# Patient Record
Sex: FEMALE | Race: BLACK OR AFRICAN AMERICAN | NOT HISPANIC OR LATINO | Employment: UNEMPLOYED | URBAN - METROPOLITAN AREA
[De-identification: names, ages, dates, MRNs, and addresses within clinical notes are randomized per-mention and may not be internally consistent; named-entity substitution may affect disease eponyms.]

---

## 2017-03-30 ENCOUNTER — TELEPHONE (OUTPATIENT)
Dept: OBSTETRICS AND GYNECOLOGY | Facility: CLINIC | Age: 42
End: 2017-03-30

## 2017-03-30 NOTE — TELEPHONE ENCOUNTER
Spoke with pt. Pt is still interested in getting IUD. Pt informed that we need to try to get her to come in before 07/17. Pt will call us as soon as she starts her cycle.

## 2017-04-03 ENCOUNTER — TELEPHONE (OUTPATIENT)
Dept: OBSTETRICS AND GYNECOLOGY | Facility: CLINIC | Age: 42
End: 2017-04-03

## 2017-04-03 NOTE — TELEPHONE ENCOUNTER
----- Message from Santos Mackay sent at 4/3/2017  9:43 AM CDT -----  Contact: Self  Pt called to schedule procedure to have birth control implanted. Pt can be reached @ [phone].

## 2017-05-22 ENCOUNTER — TELEPHONE (OUTPATIENT)
Dept: OBSTETRICS AND GYNECOLOGY | Facility: CLINIC | Age: 42
End: 2017-05-22

## 2017-05-22 NOTE — TELEPHONE ENCOUNTER
----- Message from Santos Mackay sent at 5/22/2017  3:27 PM CDT -----  Contact: Self  Pt called to schedule procedure. Pt can be reached @ [phone]

## 2017-05-24 ENCOUNTER — PROCEDURE VISIT (OUTPATIENT)
Dept: OBSTETRICS AND GYNECOLOGY | Facility: CLINIC | Age: 42
End: 2017-05-24
Payer: COMMERCIAL

## 2017-05-24 VITALS
WEIGHT: 183 LBS | SYSTOLIC BLOOD PRESSURE: 138 MMHG | HEIGHT: 66 IN | BODY MASS INDEX: 29.41 KG/M2 | DIASTOLIC BLOOD PRESSURE: 84 MMHG

## 2017-05-24 DIAGNOSIS — Z30.430 ENCOUNTER FOR INSERTION OF MIRENA IUD: Primary | ICD-10-CM

## 2017-05-24 DIAGNOSIS — Z32.02 NEGATIVE PREGNANCY TEST: ICD-10-CM

## 2017-05-24 LAB
B-HCG UR QL: NEGATIVE
CTP QC/QA: YES

## 2017-05-24 PROCEDURE — 81025 URINE PREGNANCY TEST: CPT | Mod: QW,S$GLB,, | Performed by: OBSTETRICS & GYNECOLOGY

## 2017-05-24 PROCEDURE — 58300 INSERT INTRAUTERINE DEVICE: CPT | Mod: S$GLB,,, | Performed by: OBSTETRICS & GYNECOLOGY

## 2017-05-24 NOTE — PROCEDURES
"[facility]  Ambulatory Clinic   Obstetrics & Gynecology    Date:  2017    Procedure:  Mirena IUD insertion (CPT 18931)    LMP:  2017    UPT:  Negative    Indication:  IUD contraception    History:  Nuria Franklin is a 41 y.o. , here for Mirena IUD insertion.  Pt has no major complaints today.      Consents:  We discussed the risks, benefits, indications, and alternatives to IUD use. She understands that with IUD insertion there is a risk of bleeding, infection, uterine perforation, and expulsion of device. All of her questions were answered to her satisfaction. Pt voiced understanding, Informed consents obtained.     Vitals:  /84 (BP Location: Left arm, Patient Position: Sitting, BP Method: Manual)   Ht 5' 6" (1.676 m)   Wt 83 kg (183 lb)   LMP 2017 (Exact Date)   BMI 29.54 kg/m²     Physical Exam:  Abdomen soft, non-tender, no masses. External genitalia, vaginal wall and cervix without gross abnormality.  Bimanual exam reveals a ~14 wks size uterus, non-tender, mid-plain uterus, without adnexal mass or tenderness.     Procedure Details:      A time out was performed to confirmed the correct patient and procedure.    The cervix was visualized with a speculum.     Cervix was cleaned with betadine.      A single tooth tenaculum was placed on the anterior lip of the cervix.     The uterus sounds to ~10 cm using sterile technique.     The IUD was loaded and placed high in the uterine fundus without difficulty using sterile technique.     The string was then cut with a ~3 cm tail.    The tenaculum and speculum were removed.      The patient tolerated the procedure well.  Sterile technique was maintained.  Hemostasis noted.  VSSAF, pain scale 0/10 at end of procedure.    Assessment:      Encounter for insertion of intrauterine contraceptive device (Mirena, Lot: [id number] EXP: )    Plan:      Post IUD placement counseling and precautions " discussed.    Manage post IUD placement pain with NSAIDS, Tylenol prn.    Pt advised on how to check for IUD strings.    Pt was clearly reminded that the Mirena IUD will need to be removed within 5 years from date of insertion.    Return 4 weeks for IUD check, or sooner for any concerns.  All questions answered, pt voiced understanding.  Go to ER for any emergencies.        Aamir Lopez MD

## 2017-06-21 ENCOUNTER — LAB VISIT (OUTPATIENT)
Dept: LAB | Facility: HOSPITAL | Age: 42
End: 2017-06-21
Attending: OBSTETRICS & GYNECOLOGY
Payer: COMMERCIAL

## 2017-06-21 ENCOUNTER — OFFICE VISIT (OUTPATIENT)
Dept: OBSTETRICS AND GYNECOLOGY | Facility: CLINIC | Age: 42
End: 2017-06-21
Payer: COMMERCIAL

## 2017-06-21 VITALS
DIASTOLIC BLOOD PRESSURE: 90 MMHG | WEIGHT: 183.63 LBS | HEIGHT: 66 IN | SYSTOLIC BLOOD PRESSURE: 140 MMHG | BODY MASS INDEX: 29.51 KG/M2

## 2017-06-21 DIAGNOSIS — N93.9 ABNORMAL UTERINE BLEEDING (AUB): ICD-10-CM

## 2017-06-21 DIAGNOSIS — Z30.431 IUD CHECK UP: Primary | ICD-10-CM

## 2017-06-21 DIAGNOSIS — D50.0 IRON DEFICIENCY ANEMIA DUE TO CHRONIC BLOOD LOSS: ICD-10-CM

## 2017-06-21 LAB
ANISOCYTOSIS BLD QL SMEAR: SLIGHT
BASOPHILS # BLD AUTO: 0.05 K/UL
BASOPHILS NFR BLD: 1.1 %
DACRYOCYTES BLD QL SMEAR: ABNORMAL
DIFFERENTIAL METHOD: ABNORMAL
EOSINOPHIL # BLD AUTO: 0.1 K/UL
EOSINOPHIL NFR BLD: 1.1 %
ERYTHROCYTE [DISTWIDTH] IN BLOOD BY AUTOMATED COUNT: 17.4 %
HCT VFR BLD AUTO: 25.4 %
HGB BLD-MCNC: 7.1 G/DL
HYPOCHROMIA BLD QL SMEAR: ABNORMAL
LYMPHOCYTES # BLD AUTO: 1.3 K/UL
LYMPHOCYTES NFR BLD: 29.4 %
MCH RBC QN AUTO: 18.1 PG
MCHC RBC AUTO-ENTMCNC: 28 %
MCV RBC AUTO: 65 FL
MONOCYTES # BLD AUTO: 0.4 K/UL
MONOCYTES NFR BLD: 8.4 %
NEUTROPHILS # BLD AUTO: 2.7 K/UL
NEUTROPHILS NFR BLD: 60.2 %
OVALOCYTES BLD QL SMEAR: ABNORMAL
PLATELET # BLD AUTO: 241 K/UL
PLATELET BLD QL SMEAR: ABNORMAL
PMV BLD AUTO: 10.2 FL
POIKILOCYTOSIS BLD QL SMEAR: SLIGHT
POLYCHROMASIA BLD QL SMEAR: ABNORMAL
RBC # BLD AUTO: 3.93 M/UL
TARGETS BLD QL SMEAR: ABNORMAL
TSH SERPL DL<=0.005 MIU/L-ACNC: 0.72 UIU/ML
WBC # BLD AUTO: 4.42 K/UL

## 2017-06-21 PROCEDURE — 99213 OFFICE O/P EST LOW 20 MIN: CPT | Mod: S$GLB,,, | Performed by: OBSTETRICS & GYNECOLOGY

## 2017-06-21 PROCEDURE — 85025 COMPLETE CBC W/AUTO DIFF WBC: CPT

## 2017-06-21 PROCEDURE — 84443 ASSAY THYROID STIM HORMONE: CPT

## 2017-06-21 PROCEDURE — 36415 COLL VENOUS BLD VENIPUNCTURE: CPT

## 2017-06-21 PROCEDURE — 99999 PR PBB SHADOW E&M-EST. PATIENT-LVL III: CPT | Mod: PBBFAC,,, | Performed by: OBSTETRICS & GYNECOLOGY

## 2017-06-21 RX ORDER — DOCUSATE SODIUM 100 MG/1
100 CAPSULE, LIQUID FILLED ORAL 2 TIMES DAILY PRN
Qty: 60 CAPSULE | Refills: 2 | Status: SHIPPED | OUTPATIENT
Start: 2017-06-21 | End: 2017-09-01 | Stop reason: SDUPTHER

## 2017-06-21 RX ORDER — FERROUS SULFATE 325(65) MG
325 TABLET ORAL 2 TIMES DAILY
Qty: 90 TABLET | Refills: 2 | Status: SHIPPED | OUTPATIENT
Start: 2017-06-21 | End: 2018-02-23 | Stop reason: ALTCHOICE

## 2017-06-21 NOTE — PROGRESS NOTES
"[facility]  Ambulatory Clinic  Obstetrics & Gynecology    Visit Date:  2017    Chief Complaint:  Mirena IUD check      Subjective:      Nuria Franklin is a 41 y.o. , LMP 2017, here for Mirena IUD check.  Pt is pleased with her IUD.    Pt reports lighter periods since IUD.    PT denies sxs of anemia.    Pt vaginal discharge, dysmenorrhea, dyspareunia, pelvic pain, breast mass/skin changes, GI or urinary complaints.     Otherwise, pt is in her usual state of health and has good follow-up with her PCP.    Review of Systems:     Constitutional:  No fever, fatigue  Respiratory:  No shortness of breath  Cardiovascular:  No chest pain, leg swelling  Gastrointestinal:  No abdominal pain  Genitourinary:  No dysuria, frequency  Reproductive:  See HPI     Objective:    BP (!) 140/90 (BP Location: Left arm, Patient Position: Sitting, BP Method: Manual)   Ht 5' 6" (1.676 m)   Wt 83.3 kg (183 lb 10.3 oz)   LMP 2017 (Exact Date)   BMI 29.64 kg/m²   Repeat /78, P 70, R18     GENERAL:  NAD. Well-nourished.  HEENT:  NCAT, moist mucus membranes. Neck supple.  LUNGS:  CTA-B.  HEART:  RRR, no m/g/r.  ABDOMEN:  Soft, non-tender, non-distended. Normoactive BS. No obvious organomegaly.   EXT:  Symmetric w/o cramping, claudication, or edema. +2 distal pulses.   SKIN:  No rashes or bruising.  NEURO:  Grossly intact bilaterally.  PSYCH:  Mood & affect appropriate.      PELVIC:  Female external genitalia w/o any obvious lesions. Female hair distribution. Adequate perineal body. Normal urethral meatus. No gross lymphadenopathy.   Vagina:  Pink, moist, well-rugated. Adequate support. No obvious lesion. No discharge.   Cervix:  No cervical motion tenderness, discharge, or obvious lesions. IUD string visualized ~3 cm.  Uterus:  Small, non-tender, normal contour.  Adnexa:  No masses, non-tender.   Rectal:  Declined. No obvious external lesions.   Wet prep:  Negative.    Chaperone " present for exam.    Assessment:     41 y.o. :    1. Mirena IUD check  2. Anemia  3. Abnormal uterine bleeding     Plan:    Risks, benefits, and alternatives to Mirena reviewed.  Pt tolerating it well and would like to continue.    Pt advised on how to check for IUD strings.    Pt was clearly reminded that the Mirena IUD will need to be removed within 5 years from date of insertion.    Refill Fe/colace.  Order CBC, TSH.    Pt is considering hysterectomy if no improvement with Mirena IUD.    Encourage healthy lifestyle modifications.      F/u with PCP for health maintenance.    Return 1 year for well woman exam, or sooner as needed.  All questions answered, pt voiced understanding.       Aamir Lopez MD

## 2017-09-01 ENCOUNTER — HOSPITAL ENCOUNTER (EMERGENCY)
Facility: HOSPITAL | Age: 42
Discharge: HOME | End: 2017-09-01
Attending: EMERGENCY MEDICINE
Payer: COMMERCIAL

## 2017-09-01 VITALS
DIASTOLIC BLOOD PRESSURE: 82 MMHG | WEIGHT: 182 LBS | TEMPERATURE: 98 F | SYSTOLIC BLOOD PRESSURE: 139 MMHG | BODY MASS INDEX: 29.38 KG/M2 | RESPIRATION RATE: 18 BRPM | HEART RATE: 70 BPM | OXYGEN SATURATION: 100 %

## 2017-09-01 DIAGNOSIS — S50.11XA TRAUMATIC HEMATOMA OF RIGHT FOREARM, INITIAL ENCOUNTER: Primary | ICD-10-CM
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
## 2017-09-01 LAB
B-HCG UR QL: NEGATIVE
CTP QC/QA: YES

## 2017-09-01 PROCEDURE — 99284 EMERGENCY DEPT VISIT MOD MDM: CPT

## 2017-09-01 PROCEDURE — 25000003 PHARM REV CODE 250: Performed by: EMERGENCY MEDICINE

## 2017-09-01 PROCEDURE — 81025 URINE PREGNANCY TEST: CPT | Performed by: EMERGENCY MEDICINE

## 2017-09-01 RX ORDER — HYDROCODONE BITARTRATE AND ACETAMINOPHEN 10; 325 MG/1; MG/1
1 TABLET ORAL EVERY 4 HOURS PRN
Qty: 10 TABLET | Refills: 0 | Status: SHIPPED | OUTPATIENT
Start: 2017-09-01 | End: 2018-01-31 | Stop reason: ALTCHOICE

## 2017-09-01 RX ORDER — DOCUSATE SODIUM 100 MG/1
100 CAPSULE, LIQUID FILLED ORAL 2 TIMES DAILY PRN
Qty: 20 CAPSULE | Refills: 0 | Status: SHIPPED | OUTPATIENT
Start: 2017-09-01 | End: 2018-02-23

## 2017-09-01 RX ORDER — HYDROCODONE BITARTRATE AND ACETAMINOPHEN 5; 325 MG/1; MG/1
2 TABLET ORAL
Status: COMPLETED | OUTPATIENT
Start: 2017-09-01 | End: 2017-09-01

## 2017-09-01 RX ORDER — KETOROLAC TROMETHAMINE 10 MG/1
10 TABLET, FILM COATED ORAL EVERY 6 HOURS PRN
Qty: 20 TABLET | Refills: 0 | Status: SHIPPED | OUTPATIENT
Start: 2017-09-01 | End: 2018-01-31 | Stop reason: ALTCHOICE

## 2017-09-01 RX ADMIN — HYDROCODONE BITARTRATE AND ACETAMINOPHEN 2 TABLET: 5; 325 TABLET ORAL at 05:09

## 2017-09-01 NOTE — ED PROVIDER NOTES
Encounter Date: 9/1/2017    SCRIBE #1 NOTE: I, Nadeem Vivienne, am scribing for, and in the presence of, Lashawn Wilson MD. Other sections scribed: HPI, ROS, PE.       History     Chief Complaint   Patient presents with    Fall     Pt slipped in shower and injured right forearm. CO pain and noted swelling      CC: Fall  HPI: This 41 y.o. female with iron deficiency anemia presents to the ED c/o R elbow and forearm pain s/p slip and fall in her bathroom at 2230 last night. Pt states that she hit her arm on the tub. She reports moderate (6/10) constant acute pain. She denies any head trauma, loss of consciousness, numbness, neck pain, back pain, other injuries.            Review of patient's allergies indicates:  No Known Allergies  Past Medical History:   Diagnosis Date    Anemia     Miscarriage     2013    Preeclampsia 10/6/2014     Past Surgical History:   Procedure Laterality Date    DILATION AND CURETTAGE OF UTERUS       History reviewed. No pertinent family history.  Social History   Substance Use Topics    Smoking status: Never Smoker    Smokeless tobacco: Never Used    Alcohol use No     Review of Systems   Constitutional: Negative for chills and fever.   HENT: Negative for ear pain, rhinorrhea and sore throat.    Eyes: Negative for pain and visual disturbance.   Respiratory: Negative for cough and shortness of breath.    Cardiovascular: Negative for chest pain.   Gastrointestinal: Negative for abdominal pain, diarrhea and nausea.   Genitourinary: Negative for difficulty urinating and dyspareunia.   Musculoskeletal: Positive for arthralgias (R elbow) and myalgias (R forearm). Negative for neck pain and neck stiffness.   Skin: Negative for rash and wound.   Neurological: Negative for syncope, weakness, numbness and headaches.   Hematological:        Anemia       Physical Exam     Initial Vitals [09/01/17 0033]   BP Pulse Resp Temp SpO2   (!) 181/95 70 20 98.7 °F (37.1 °C) 100 %      MAP       123.67          Physical Exam    Nursing note and vitals reviewed.  Constitutional: She appears well-developed and well-nourished. She is not diaphoretic. No distress.   HENT:   Head: Normocephalic and atraumatic.   Mouth/Throat: Oropharynx is clear and moist.   Eyes: Conjunctivae and EOM are normal. Pupils are equal, round, and reactive to light.   Neck: Normal range of motion. Neck supple.   Cardiovascular: Normal rate, regular rhythm and normal heart sounds.   Pulmonary/Chest: Breath sounds normal. No respiratory distress. She has no wheezes. She has no rhonchi. She has no rales. She exhibits no tenderness.   Abdominal: She exhibits no distension. There is no tenderness.   Musculoskeletal: Normal range of motion. She exhibits tenderness.   Large hematoma to R ulnar forearm with superficial bruising noted. Hand is warm with good cap refill. No crepitus or bony tenderness in arm; she is ranging all joints well.    Neurological: She is alert and oriented to person, place, and time. She has normal strength.   Skin: Skin is warm and dry.   Psychiatric: Her behavior is normal. Thought content normal.         ED Course   Procedures  Labs Reviewed   POCT URINE PREGNANCY          X-Rays:   Independently Interpreted Readings:   Other Readings:  RUE XR no fx    Medical Decision Making:   History:   Old Medical Records: I decided to obtain old medical records.  Old Records Summarized: records from clinic visits.  Initial Assessment:   41 y.o. Female with R arm pain s/p fall.  Differential Diagnosis:   Ddx includes fx, dislocation, sprain/strain, other.  Independently Interpreted Test(s):   I have ordered and independently interpreted X-rays - see prior notes.  Clinical Tests:   Lab Tests: Ordered and Reviewed  The following lab test(s) were unremarkable: UPT  Radiological Study: Ordered and Reviewed  ED Management:  XR negative for fx. Ranging arm well. Has large hematoma. Supportive tx.             Scribe Attestation:   Scribe #1: I  performed the above scribed service and the documentation accurately describes the services I performed. I attest to the accuracy of the note.    Attending Attestation:           Physician Attestation for Scribe:  Physician Attestation Statement for Scribe #1: I, Lashawn Wilson MD, reviewed documentation, as scribed by Nadeem Palafox in my presence, and it is both accurate and complete.                 ED Course      Clinical Impression:   The primary encounter diagnosis was Traumatic hematoma of right forearm, initial encounter. A diagnosis of Fall was also pertinent to this visit.                           Lashawn Wilson MD  09/02/17 1534

## 2017-09-01 NOTE — ED TRIAGE NOTES
Pt presents to ED with c/o a fall that occurred at 2230 on 8/31.  Pt reports that she tripped in the bathroom and hit her right forearm on the tub.  Pt denies hitting her head.  Pt reports pain in the right forearm 10/10.  Pt denies taking any medication for pain.

## 2017-09-06 ENCOUNTER — OFFICE VISIT (OUTPATIENT)
Dept: FAMILY MEDICINE | Facility: CLINIC | Age: 42
End: 2017-09-06
Payer: COMMERCIAL

## 2017-09-06 VITALS
RESPIRATION RATE: 17 BRPM | OXYGEN SATURATION: 98 % | HEART RATE: 69 BPM | HEIGHT: 67 IN | WEIGHT: 187.38 LBS | DIASTOLIC BLOOD PRESSURE: 98 MMHG | TEMPERATURE: 98 F | BODY MASS INDEX: 29.41 KG/M2 | SYSTOLIC BLOOD PRESSURE: 146 MMHG
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
## 2017-09-06 DIAGNOSIS — S40.021A TRAUMATIC HEMATOMA OF RIGHT UPPER ARM, INITIAL ENCOUNTER: ICD-10-CM

## 2017-09-06 DIAGNOSIS — I10 ESSENTIAL HYPERTENSION: Primary | ICD-10-CM

## 2017-09-06 PROCEDURE — 3077F SYST BP >= 140 MM HG: CPT | Mod: S$GLB,,, | Performed by: INTERNAL MEDICINE

## 2017-09-06 PROCEDURE — 99999 PR PBB SHADOW E&M-EST. PATIENT-LVL III: CPT | Mod: PBBFAC,,, | Performed by: INTERNAL MEDICINE

## 2017-09-06 PROCEDURE — 3080F DIAST BP >= 90 MM HG: CPT | Mod: S$GLB,,, | Performed by: INTERNAL MEDICINE

## 2017-09-06 PROCEDURE — 3008F BODY MASS INDEX DOCD: CPT | Mod: S$GLB,,, | Performed by: INTERNAL MEDICINE

## 2017-09-06 PROCEDURE — 99204 OFFICE O/P NEW MOD 45 MIN: CPT | Mod: S$GLB,,, | Performed by: INTERNAL MEDICINE

## 2017-09-06 RX ORDER — AMLODIPINE BESYLATE 5 MG/1
5 TABLET ORAL DAILY
Qty: 30 TABLET | Refills: 0 | Status: SHIPPED | OUTPATIENT
Start: 2017-09-06 | End: 2017-09-27 | Stop reason: SDUPTHER

## 2017-09-06 NOTE — PROGRESS NOTES
Subjective:       Patient ID: Nuria Franklin is a 41 y.o. female.    Chief Complaint:     She presents to establish care and for follow-up after recent ER visit status post fall with injury to her right forearm.  She fell in the bathtub almost one week ago.  Her x-rays are negative for fracture.  She continues to have 6 out of 10 pain with significant swelling and bruising.  She has not returned to work.  She does report a history of high blood pressure over the past 3 years.  She is taking medications off and on but never consistently.  She does not recall which medication she's been on.  She does complain of intermittent headaches.      Review of Systems   Musculoskeletal: Positive for arthralgias and joint swelling.   Skin: Positive for wound.   Neurological: Positive for headaches.       Objective:      Physical Exam   Constitutional: She is oriented to person, place, and time. She appears well-developed and well-nourished. No distress.   HENT:   Head: Normocephalic and atraumatic.   Right Ear: External ear normal.   Left Ear: External ear normal.   Eyes: Conjunctivae are normal. No scleral icterus.   Cardiovascular: Normal rate, regular rhythm and normal heart sounds.  Exam reveals no gallop and no friction rub.    No murmur heard.  Pulmonary/Chest: Effort normal and breath sounds normal. No respiratory distress. She has no wheezes. She has no rales.   Musculoskeletal:        Right forearm: She exhibits tenderness and swelling.   Right forearm extending to the elbow and lower upper arm with bruising and swelling with significant in the forearm with associated tenderness to palpation   Neurological: She is alert and oriented to person, place, and time. No cranial nerve deficit.   Skin: Skin is warm and dry.   Psychiatric: She has a normal mood and affect.   Vitals reviewed.      Assessment:       1. Essential hypertension    2. Traumatic hematoma of right upper arm, initial encounter    3. Fall, initial  encounter        Plan:       Nuria was seen today for hypertension.    Diagnoses and all orders for this visit:    Essential hypertension - she reports a two-year history of elevated blood pressure.  She has not taken medication consistently.  Starting amlodipine today.  We have discussed importance of compliance and the risk of long-term complications associated with uncontrolled blood pressure.  We have also discussed low sodium diet, avoiding fast food and prepackaged foods.  -     amlodipine (NORVASC) 5 MG tablet; Take 1 tablet (5 mg total) by mouth once daily.    Traumatic hematoma of right upper arm, initial encounter - status post fall 1 week ago.  X-rays negative.  Continue current medications.  She will return to work on September 11 Fall, initial encounter         follow-up in 3 weeks

## 2017-09-27 ENCOUNTER — OFFICE VISIT (OUTPATIENT)
Dept: FAMILY MEDICINE | Facility: CLINIC | Age: 42
End: 2017-09-27
Payer: COMMERCIAL

## 2017-09-27 ENCOUNTER — LAB VISIT (OUTPATIENT)
Dept: LAB | Facility: HOSPITAL | Age: 42
End: 2017-09-27
Attending: INTERNAL MEDICINE
Payer: COMMERCIAL

## 2017-09-27 VITALS
BODY MASS INDEX: 28.89 KG/M2 | RESPIRATION RATE: 17 BRPM | OXYGEN SATURATION: 99 % | WEIGHT: 184.06 LBS | SYSTOLIC BLOOD PRESSURE: 128 MMHG | HEART RATE: 75 BPM | TEMPERATURE: 99 F | HEIGHT: 67 IN | DIASTOLIC BLOOD PRESSURE: 74 MMHG

## 2017-09-27 DIAGNOSIS — I10 ESSENTIAL HYPERTENSION: ICD-10-CM

## 2017-09-27 LAB
ANION GAP SERPL CALC-SCNC: 6 MMOL/L
BUN SERPL-MCNC: 15 MG/DL
CALCIUM SERPL-MCNC: 9.7 MG/DL
CHLORIDE SERPL-SCNC: 111 MMOL/L
CO2 SERPL-SCNC: 24 MMOL/L
CREAT SERPL-MCNC: 0.8 MG/DL
EST. GFR  (AFRICAN AMERICAN): >60 ML/MIN/1.73 M^2
EST. GFR  (NON AFRICAN AMERICAN): >60 ML/MIN/1.73 M^2
GLUCOSE SERPL-MCNC: 97 MG/DL
POTASSIUM SERPL-SCNC: 4.7 MMOL/L
SODIUM SERPL-SCNC: 141 MMOL/L

## 2017-09-27 PROCEDURE — 36415 COLL VENOUS BLD VENIPUNCTURE: CPT | Mod: PN

## 2017-09-27 PROCEDURE — 3008F BODY MASS INDEX DOCD: CPT | Mod: S$GLB,,, | Performed by: INTERNAL MEDICINE

## 2017-09-27 PROCEDURE — 99213 OFFICE O/P EST LOW 20 MIN: CPT | Mod: S$GLB,,, | Performed by: INTERNAL MEDICINE

## 2017-09-27 PROCEDURE — 99999 PR PBB SHADOW E&M-EST. PATIENT-LVL III: CPT | Mod: PBBFAC,,, | Performed by: INTERNAL MEDICINE

## 2017-09-27 PROCEDURE — 3078F DIAST BP <80 MM HG: CPT | Mod: S$GLB,,, | Performed by: INTERNAL MEDICINE

## 2017-09-27 PROCEDURE — 80048 BASIC METABOLIC PNL TOTAL CA: CPT

## 2017-09-27 PROCEDURE — 3074F SYST BP LT 130 MM HG: CPT | Mod: S$GLB,,, | Performed by: INTERNAL MEDICINE

## 2017-09-27 RX ORDER — AMLODIPINE BESYLATE 5 MG/1
5 TABLET ORAL DAILY
Qty: 30 TABLET | Refills: 5 | Status: SHIPPED | OUTPATIENT
Start: 2017-09-27 | End: 2018-02-23 | Stop reason: ALTCHOICE

## 2017-09-27 NOTE — PROGRESS NOTES
Subjective:       Patient ID: Nuria Franklin is a 42 y.o. female.    Chief Complaint: Hypertension and Follow-up (3 week )    She presents for follow-up of her blood pressure today.  She is tolerating amlodipine without side effects.  Her headaches have resolved.  She now understands importance of compliance with blood pressure medication.      Review of Systems   Cardiovascular: Negative for leg swelling.   Neurological: Negative for headaches.       Objective:      Physical Exam   Constitutional: She is oriented to person, place, and time. She appears well-developed and well-nourished. No distress.   HENT:   Head: Normocephalic and atraumatic.   Eyes: Conjunctivae are normal. No scleral icterus.   Neurological: She is alert and oriented to person, place, and time.   Psychiatric: She has a normal mood and affect.   Vitals reviewed.      Assessment:       1. Essential hypertension        Plan:       Nuria was seen today for hypertension and follow-up.    Diagnoses and all orders for this visit:    Essential hypertension - BP significantly improved on amlodipine 5 mg.  Continue this dose.  BMP today.  Stressed the importance of compliance.  Follow-up in 4 months and when necessary  -     amlodipine (NORVASC) 5 MG tablet; Take 1 tablet (5 mg total) by mouth once daily.  -     Basic metabolic panel; Future

## 2017-11-09 ENCOUNTER — OFFICE VISIT (OUTPATIENT)
Dept: FAMILY MEDICINE | Facility: CLINIC | Age: 42
End: 2017-11-09
Payer: COMMERCIAL

## 2017-11-09 VITALS
OXYGEN SATURATION: 99 % | HEIGHT: 67 IN | SYSTOLIC BLOOD PRESSURE: 126 MMHG | TEMPERATURE: 99 F | HEART RATE: 87 BPM | DIASTOLIC BLOOD PRESSURE: 74 MMHG | WEIGHT: 186.5 LBS | RESPIRATION RATE: 17 BRPM | BODY MASS INDEX: 29.27 KG/M2

## 2017-11-09 DIAGNOSIS — M25.422 EFFUSION OF LEFT OLECRANON BURSA: Primary | ICD-10-CM

## 2017-11-09 PROCEDURE — 99213 OFFICE O/P EST LOW 20 MIN: CPT | Mod: S$GLB,,, | Performed by: INTERNAL MEDICINE

## 2017-11-09 PROCEDURE — 99999 PR PBB SHADOW E&M-EST. PATIENT-LVL III: CPT | Mod: PBBFAC,,, | Performed by: INTERNAL MEDICINE

## 2017-11-09 NOTE — PROGRESS NOTES
Subjective:       Patient ID: Nuria Franklin is a 42 y.o. female.    Chief Complaint: Cyst (left elbow )    She presents with a one month history of cyst on her left elbow.  She is not sure of any trauma but did have a fall over one month ago.  She denies any associated pain, redness or drainage.  She is a  and often hits her elbow when cleaning.       Review of Systems   Musculoskeletal: Negative for arthralgias and joint swelling.       Objective:      Physical Exam   Constitutional: She is oriented to person, place, and time. She appears well-developed and well-nourished. No distress.   HENT:   Head: Normocephalic and atraumatic.   Eyes: Conjunctivae are normal. No scleral icterus.   Musculoskeletal:   Large left elbow effusion.  No tenderness, no redness or warmth.  No decreased ROM   Neurological: She is alert and oriented to person, place, and time.   Skin: Skin is warm and dry.   Psychiatric: She has a normal mood and affect.   Vitals reviewed.      Assessment:       1. Effusion of left olecranon bursa        Plan:       Nuria was seen today for cyst.    Diagnoses and all orders for this visit:    Effusion of left olecranon bursa - first noted one month ago.  Reassured the patient.  Will refer to orthopedics for further management.   -     Ambulatory referral to Orthopedics       f/u prn

## 2017-11-10 ENCOUNTER — TELEPHONE (OUTPATIENT)
Dept: FAMILY MEDICINE | Facility: CLINIC | Age: 42
End: 2017-11-10

## 2018-01-31 ENCOUNTER — LAB VISIT (OUTPATIENT)
Dept: LAB | Facility: HOSPITAL | Age: 43
End: 2018-01-31
Attending: OBSTETRICS & GYNECOLOGY
Payer: COMMERCIAL

## 2018-01-31 ENCOUNTER — OFFICE VISIT (OUTPATIENT)
Dept: OBSTETRICS AND GYNECOLOGY | Facility: CLINIC | Age: 43
End: 2018-01-31
Payer: COMMERCIAL

## 2018-01-31 VITALS
DIASTOLIC BLOOD PRESSURE: 82 MMHG | BODY MASS INDEX: 29.76 KG/M2 | HEIGHT: 67 IN | SYSTOLIC BLOOD PRESSURE: 120 MMHG | WEIGHT: 189.63 LBS

## 2018-01-31 DIAGNOSIS — D25.1 INTRAMURAL, SUBMUCOUS, AND SUBSEROUS LEIOMYOMA OF UTERUS: ICD-10-CM

## 2018-01-31 DIAGNOSIS — D50.0 IRON DEFICIENCY ANEMIA DUE TO CHRONIC BLOOD LOSS: ICD-10-CM

## 2018-01-31 DIAGNOSIS — N93.9 ABNORMAL UTERINE BLEEDING (AUB): Primary | ICD-10-CM

## 2018-01-31 DIAGNOSIS — D25.2 INTRAMURAL, SUBMUCOUS, AND SUBSEROUS LEIOMYOMA OF UTERUS: ICD-10-CM

## 2018-01-31 DIAGNOSIS — D25.0 INTRAMURAL, SUBMUCOUS, AND SUBSEROUS LEIOMYOMA OF UTERUS: ICD-10-CM

## 2018-01-31 LAB
ALBUMIN SERPL BCP-MCNC: 3.6 G/DL
ALP SERPL-CCNC: 97 U/L
ALT SERPL W/O P-5'-P-CCNC: 23 U/L
ANION GAP SERPL CALC-SCNC: 6 MMOL/L
AST SERPL-CCNC: 20 U/L
BASOPHILS # BLD AUTO: 0.04 K/UL
BASOPHILS NFR BLD: 0.5 %
BILIRUB SERPL-MCNC: 0.4 MG/DL
BUN SERPL-MCNC: 13 MG/DL
CALCIUM SERPL-MCNC: 9.3 MG/DL
CHLORIDE SERPL-SCNC: 109 MMOL/L
CO2 SERPL-SCNC: 24 MMOL/L
CREAT SERPL-MCNC: 0.8 MG/DL
DIFFERENTIAL METHOD: ABNORMAL
EOSINOPHIL # BLD AUTO: 0.1 K/UL
EOSINOPHIL NFR BLD: 1.1 %
ERYTHROCYTE [DISTWIDTH] IN BLOOD BY AUTOMATED COUNT: 15.1 %
EST. GFR  (AFRICAN AMERICAN): >60 ML/MIN/1.73 M^2
EST. GFR  (NON AFRICAN AMERICAN): >60 ML/MIN/1.73 M^2
GLUCOSE SERPL-MCNC: 93 MG/DL
HCT VFR BLD AUTO: 30.7 %
HGB BLD-MCNC: 9.8 G/DL
LYMPHOCYTES # BLD AUTO: 1.9 K/UL
LYMPHOCYTES NFR BLD: 25.7 %
MCH RBC QN AUTO: 25.2 PG
MCHC RBC AUTO-ENTMCNC: 31.9 G/DL
MCV RBC AUTO: 79 FL
MONOCYTES # BLD AUTO: 0.6 K/UL
MONOCYTES NFR BLD: 7.7 %
NEUTROPHILS # BLD AUTO: 4.8 K/UL
NEUTROPHILS NFR BLD: 64.9 %
PLATELET # BLD AUTO: 205 K/UL
PMV BLD AUTO: 11.1 FL
POTASSIUM SERPL-SCNC: 3.9 MMOL/L
PROT SERPL-MCNC: 7.3 G/DL
RBC # BLD AUTO: 3.89 M/UL
SODIUM SERPL-SCNC: 139 MMOL/L
WBC # BLD AUTO: 7.44 K/UL

## 2018-01-31 PROCEDURE — 3008F BODY MASS INDEX DOCD: CPT | Mod: S$GLB,,, | Performed by: OBSTETRICS & GYNECOLOGY

## 2018-01-31 PROCEDURE — 85025 COMPLETE CBC W/AUTO DIFF WBC: CPT

## 2018-01-31 PROCEDURE — 99213 OFFICE O/P EST LOW 20 MIN: CPT | Mod: S$GLB,,, | Performed by: OBSTETRICS & GYNECOLOGY

## 2018-01-31 PROCEDURE — 36415 COLL VENOUS BLD VENIPUNCTURE: CPT

## 2018-01-31 PROCEDURE — 80053 COMPREHEN METABOLIC PANEL: CPT

## 2018-01-31 PROCEDURE — 99999 PR PBB SHADOW E&M-EST. PATIENT-LVL IV: CPT | Mod: PBBFAC,,, | Performed by: OBSTETRICS & GYNECOLOGY

## 2018-01-31 NOTE — PROGRESS NOTES
"[facility]  Ambulatory Clinic  Obstetrics & Gynecology    Date of Visit:  2018    Chief Complaint:  F/u vaginal bleeding     Subjective:      Nuria Franklin is a 42 y.o.  here f/u for vaginal bleeding.  Pt is here today specifically requesting hysterectomy due to vaginal bleeding.  Pt has attempted medical therapy with Mirena IUD.  "I am sure, don't want to deal with bleeding anymore, I feel tired all time".  Pt was in May 2016 for blood transfusion secondary to her sxs anemia.  Pt denies any sxs of anemia, sob, dizziness, weakness, heart palpitation, or CP.  Pt denies any vaginal discharge, pelvic pain, breast complaints, GI or urinary compliants.    Pt declined Sensorion  services, daughter present for visit.    Review of Systems:      GENERAL:  No fever, +fatigue. No excessive weight gain or loss  HEENT:  No head injury, headaches, hearing changes, visual disturbance  RESPIRATORY:  No cough, shortness of breath  CARDIOVASCULAR:  No chest pain, heart palpitations, leg swelling  BREAST:  No lump, pain, nipple discharge, skin changes  GASTROINTESTINAL:  No nausea, vomiting, constipation, diarrhea, abd pain, rectal bleeding   URINARY:  No dysuria, frequency, hematuria  GENITOURINARY:  See HPI  ENDOCRINE:  No heat or cold intolerance  HEMATOLOGIC:  No easy bruisability or bleeding   NEUROLOGIC:  No dizziness, weakness, syncope    Objective:     /82 (BP Location: Left arm, Patient Position: Sitting, BP Method: Large (Manual))   Ht 5' 6.5" (1.689 m)   Wt 86 kg (189 lb 9.5 oz)   LMP 2017 (Approximate)   BMI 30.14 kg/m²    Pulse 74, Resp rate 16     GENERAL:  NAD  HEENT:  NCAT,moist mucus membranes, neck supple.  LUNGS:  Clear  HEART:  Regular rate and rhythm with physiologic heart sounds.  ABDOMEN:  Soft, non-tender without any guarding, rigidity or rebound.    EXT:  Symmetric. No cramping, claudication, or edema. +2 distal pulses. FROM.  SKIN:  No " "rashes, good turgor & capillary refill.  NEURO:  Grossly intact bilaterally. +2 DTR.  PELVIC:  Pt declined.    Chaperone present for exam.    Lab Results   Component Value Date    WBC 7.44 2018    HGB 9.8 (L) 2018    HCT 30.7 (L) 2018    MCV 79 (L) 2018     2018     Assessment:     42 y.o. :    1. Abnormal uterine bleeding  2. H/o blood transfusion 2016 for sxs anemia  3. Uterine fibroids    Plan:    D/w pt in great detail her abnormal uterine bleeding.   Pt is requesting hysterectomy "don't want to do anything else".  The proposed procedure will be total laparoscopic hysterectomy.  Risks, benefits, and alternatives to hysterectomy reviewed.  Bleeding/pelvic/anemia precautions.  Fe supplementation.    Return for pre-op visit pending OR scheduling, or sooner if any concerns.      Pt voiced understanding.       Aamir Lopez MD  "

## 2018-02-23 ENCOUNTER — HOSPITAL ENCOUNTER (OUTPATIENT)
Dept: PREADMISSION TESTING | Facility: HOSPITAL | Age: 43
Discharge: HOME | End: 2018-02-23
Attending: OBSTETRICS & GYNECOLOGY
Payer: COMMERCIAL

## 2018-02-23 ENCOUNTER — OFFICE VISIT (OUTPATIENT)
Dept: OBSTETRICS AND GYNECOLOGY | Facility: CLINIC | Age: 43
End: 2018-02-23
Payer: COMMERCIAL

## 2018-02-23 VITALS
TEMPERATURE: 98 F | HEIGHT: 66 IN | SYSTOLIC BLOOD PRESSURE: 127 MMHG | BODY MASS INDEX: 29.89 KG/M2 | WEIGHT: 186 LBS | RESPIRATION RATE: 17 BRPM | OXYGEN SATURATION: 99 % | HEART RATE: 80 BPM | DIASTOLIC BLOOD PRESSURE: 84 MMHG

## 2018-02-23 VITALS
WEIGHT: 185.19 LBS | DIASTOLIC BLOOD PRESSURE: 82 MMHG | HEIGHT: 67 IN | BODY MASS INDEX: 29.07 KG/M2 | SYSTOLIC BLOOD PRESSURE: 142 MMHG

## 2018-02-23 DIAGNOSIS — N93.9 ABNORMAL UTERINE BLEEDING (AUB): Primary | ICD-10-CM

## 2018-02-23 DIAGNOSIS — D50.0 IRON DEFICIENCY ANEMIA DUE TO CHRONIC BLOOD LOSS: ICD-10-CM

## 2018-02-23 DIAGNOSIS — Z01.818 PRE-OP TESTING: Primary | ICD-10-CM

## 2018-02-23 DIAGNOSIS — N93.9 ABNORMAL UTERINE BLEEDING (AUB): ICD-10-CM

## 2018-02-23 DIAGNOSIS — Z92.89 H/O TRANSFUSION OF PACKED RED BLOOD CELLS: ICD-10-CM

## 2018-02-23 LAB
ALBUMIN SERPL BCP-MCNC: 3.7 G/DL
ALP SERPL-CCNC: 100 U/L
ALT SERPL W/O P-5'-P-CCNC: 27 U/L
ANION GAP SERPL CALC-SCNC: 6 MMOL/L
AST SERPL-CCNC: 23 U/L
BASOPHILS # BLD AUTO: 0.02 K/UL
BASOPHILS NFR BLD: 0.4 %
BILIRUB SERPL-MCNC: 0.5 MG/DL
BUN SERPL-MCNC: 17 MG/DL
CALCIUM SERPL-MCNC: 9.4 MG/DL
CHLORIDE SERPL-SCNC: 109 MMOL/L
CO2 SERPL-SCNC: 22 MMOL/L
CREAT SERPL-MCNC: 0.8 MG/DL
DIFFERENTIAL METHOD: ABNORMAL
EOSINOPHIL # BLD AUTO: 0.1 K/UL
EOSINOPHIL NFR BLD: 1.5 %
ERYTHROCYTE [DISTWIDTH] IN BLOOD BY AUTOMATED COUNT: 14.1 %
EST. GFR  (AFRICAN AMERICAN): >60 ML/MIN/1.73 M^2
EST. GFR  (NON AFRICAN AMERICAN): >60 ML/MIN/1.73 M^2
GLUCOSE SERPL-MCNC: 111 MG/DL
HCT VFR BLD AUTO: 29.9 %
HGB BLD-MCNC: 9.4 G/DL
LYMPHOCYTES # BLD AUTO: 1.3 K/UL
LYMPHOCYTES NFR BLD: 25.3 %
MCH RBC QN AUTO: 24.5 PG
MCHC RBC AUTO-ENTMCNC: 31.4 G/DL
MCV RBC AUTO: 78 FL
MONOCYTES # BLD AUTO: 0.5 K/UL
MONOCYTES NFR BLD: 8.5 %
NEUTROPHILS # BLD AUTO: 3.4 K/UL
NEUTROPHILS NFR BLD: 64.3 %
PLATELET # BLD AUTO: 215 K/UL
PMV BLD AUTO: 11.3 FL
POTASSIUM SERPL-SCNC: 4.2 MMOL/L
PROT SERPL-MCNC: 7.7 G/DL
RBC # BLD AUTO: 3.83 M/UL
SODIUM SERPL-SCNC: 137 MMOL/L
WBC # BLD AUTO: 5.3 K/UL

## 2018-02-23 PROCEDURE — 99499 UNLISTED E&M SERVICE: CPT | Mod: S$GLB,,, | Performed by: OBSTETRICS & GYNECOLOGY

## 2018-02-23 PROCEDURE — 80053 COMPREHEN METABOLIC PANEL: CPT

## 2018-02-23 PROCEDURE — 99999 PR PBB SHADOW E&M-EST. PATIENT-LVL III: CPT | Mod: PBBFAC,,, | Performed by: OBSTETRICS & GYNECOLOGY

## 2018-02-23 PROCEDURE — 85025 COMPLETE CBC W/AUTO DIFF WBC: CPT

## 2018-02-23 RX ORDER — NAPROXEN SODIUM 220 MG
220 TABLET ORAL 2 TIMES DAILY PRN
Status: ON HOLD | COMMUNITY
End: 2018-03-02 | Stop reason: HOSPADM

## 2018-02-23 NOTE — DISCHARGE INSTRUCTIONS
Your surgery is scheduled for__FRIDAY MARCH 2, 2018_______________.    Call [phone] between 2 pm and 5 pm _THURSDAY MARCH 1, 2018_____ to find out your arrival time for the day of surgery.      Report to SAME DAY SURGERY UNIT at _______AM   on the 2nd floor of the hospital.  Use the front entrance of the hospital before 6 am.                                             Important instructions:   Do not eat or drink after 12 midnight, including water.  It is okay to brush your teeth.                                                                                                                                                                                                 Do not have gum, candy or mints.    DO NOT TAKE ANY MEDICATIONS THE AM OF SURGERY                  Return to the hospital lab on _THURSDAY MARCH 1, 2018  @ 9:30 PM_____for additional blood test.       Prep instructions:     SHOWER   OTHER_____________      Please shower the night before and the morning of your surgery.   For this procedure you will shower at home the night before and also the morning of surgery with HIBICLENS soap provided by the pre op nurse. Do not use this soap on your face or genitals. You will shower a third time here at the hospital on the morning of surgery. Rinse completely after each shower.  If your surgery is on your abdomen,  Be sure to wash your Belly Button ( Naval )  Please place clean linens on your bed the night before surgery. Please wear fresh clean clothing after each shower.   No shaving of procedural area at least 4-5 days before surgery due to  increased risk of skin irritation and/or possible infection.      Do not wear make- up, including mascara.     You may wear deodorant only.     Do not wear powder, body lotion or cologne.     If you are going home on the same day of surgery, you must have arrangements for a ride home.  You will not be able to drive home if you were given anesthesia or  sedation.       Stop taking Aspirin, Ibuprofen, Motrin and Aleve at least 3-5 days before your surgery.                                                                                                                                                                                                              May take Tylenol / Acetaminophen  If needed     Wear loose fitting clothes allowing for bandages.     Please leave money and valuables home.        You may bring your cell phone.     Call the doctor if fever or illness should occur before your surgery.    Call [phone] to contact us here at [facility] if needed.

## 2018-02-25 NOTE — H&P
[facility]  History & Physical  Obstetrics & Gynecology    Visit Date:  2018    Chief Complaint:  Pre-op for total laparoscopic hysterectomy     History of Present Illness:      Nuria Franklin is a 42 y.o.  here for pre-op for a total laparoscopic hysterectomy.    Pt is requesting definitive treatment with hysterectomy due to abnormal uterine bleeding/menorrhagia with h/o anemia requiring blood transfusion.    Pt attempted medical management with Mirena IUD without satisfactory resolution of her symptoms.    Pt is resolute in her decision to proceed with surgery and has declined further medical management or other conservative therapies.      Otherwise, pt is in her usual state of health.    Past Medical History:      Diagnosis Date    Anemia     Miscarriage         Preeclampsia 10/6/2014     Past Surgical History:      Procedure Laterality Date    DILATION AND CURETTAGE OF UTERUS       Medications:     Current Outpatient Prescriptions on File Prior to Visit   Medication Sig Dispense Refill    naproxen sodium (ANAPROX) 220 MG tablet Take 220 mg by mouth 2 (two) times daily as needed.       Allergies:     NKDA      Gynecologic History:      Denies active STI or h/o abnormal Pap     Social History:      Denies tobacco, alcohol abuse or illicit drug use  Denies domestic abuse     Family History:       History reviewed. No pertinent family history.      Noncontributory    Review of Systems:      GENERAL:  No fever, fatigue, excessive weight gain or loss  HEENT:  No head injury, headaches, hearing changes, visual disturbance  RESPIRATORY:  No cough, shortness of breath  CARDIOVASCULAR:  No chest pain, heart palpitations, leg swelling  GASTROINTESTINAL:  No nausea, vomiting, constipation, diarrhea, abd pain, rectal bleeding   GENITOURINARY:  See HPI.  HEMATOLOGIC:  No easy bruisability or bleeding   LYMPHATICS:  No enlarged nodes  MUSCULOSKELETAL:  No joint pain or  "swelling  SKIN:  No rash, lesions, jaundice  NEUROLOGIC:  No dizziness, weakness, syncope  PSYCHIATRIC:  No depression, anxiety or mood swings     Physical Exam:     BP (!) 142/82 (BP Location: Left arm, Patient Position: Sitting, BP Method: Large (Manual))   Ht 5' 6.5" (1.689 m)   Wt 84 kg (185 lb 3 oz)   BMI 29.44 kg/m²      GENERAL: NAD, well-nourished  HEENT: NCAT, moist mucus membranes, anicteric, neck supple  LUNGS: CTA-B  HEART: RRR  ABDOMEN: Soft, non-tender. Normoactive BS. No obvious organomegaly.  EXT: Symmetric w/o cramping, claudication, or edema. +2 distal pulses.  SKIN: No rashes or bruising  NEURO: Grossly intact bilaterally  PSYCH: Mood and affect appropriate  GENITOURINARY:  NFEG no lesion. No vaginal or cervical lesion. No bleeding or discharge. No CMT. Uterus and ovaries small, NT. Wet prep negative. IUD string visualized ~3 cm from Os. Declined rectal exam. No obvious external lesions.    Labs/studies:    Lab Results   Component Value Date    WBC 5.30 2018    HGB 9.4 (L) 2018    HCT 29.9 (L) 2018    MCV 78 (L) 2018     2018     Pelvic U/S:  2016    The uterus measures 15.3 x 7.2 x 8.0cm and demonstrates a large amount of complex material within its endometrial cavity.  The endometrium appears heterogeneous and the endometrial thickness is 12 mm.  There is a 1.3 cm mass in the posterior body of the uterus suggestive of a fibroid.  The uterus is otherwise unremarkable.    The right ovary measures 3.5 x 1.9 x 2.1 cm and is unremarkable.    The left ovary measures 5.3 x 2.8 x 3.1 cm and demonstrates a 2.6 cm complex cyst.  The left ovary is otherwise unremarkable.    There is intact vascular flow to the bilateral ovaries. There is a mild amount of free fluid in the visualized pelvis.    EMB:  2016    ENDOMETRIUM, BIOPSY:  -Secretory phase endometrium.  -No hyperplasia or malignancy.    Assessment:     42 y.o.  with:    1. Abnormal uterine " bleeding/enorrhagia  2. H/o anemia with blood transfusion    Plan:    We discussed her condition in great detail.  The proposed procedure will be a total laparoscopic hysterectomy, bilateral salpingectomy, and ovarian conservation (if the ovaries are not damaged or diseased) if feasible all of which as determined at the time of surgery.  Pt has declined medical management or other conservative therapies, and is resolute in her decision to proceed with surgery.        Pt was informed of the risks, benefits, and alternatives of total laparoscopic hysterectomy.  Risks included but were not limited to bleeding, infection, injury to surrounding organs or tissues, injury to bladder and/or urinary tract, injury to bowel and/or intestinal obstruction, damage to major blood vessels, hemorrhage requiring transfusion of blood products, ovarian failure requiring hormone administration, pulmonary embolism, fistula formation, urinary and/or fecal incontinence, sexual dysfunction/pain, chronic pain, hernia, failure of wound to heal, permanent/disfiguring scarring, and even death.  In the event of a supracervical hysterectomy, she was also counseled on the need for long term follow-up with pap smears and the possibility of future trachelectomy.  Pt was counseled extensively on the inability to become pregnant following a hysterectomy and expressed an understanding of this.  Pt was also counseled that a bilateral oophorectomy will result in surgical menopause for pre/perimenopausal women and the long term health consequences thereof.  Pt was counseled in the possibility of laparotomy if extensive adhesions or bleeding were encountered.  Pt was counseled on the cancer risk associated with the use of a uterine morcellator.  Pt was counseled that hysterectomy and/or oophorectomy may not result in the resolution of her pain symptoms.  Pt was also counseled on the risk of uterine and/or ovarian cancer diagnosed on post-op pathology which  may require additional future surgery and/or treatment.  The risks, benefits, and alternatives to blood transfusion was also discussed.  Pt expressed an understanding of risks involved, all questions answered, and informed consent was obtained for the procedure and blood transfusion.    Surgery tentatively scheduled for 3/2/2018.    Pre-op instructions reviewed.      All questions answered, pt voiced understanding.        Aamir Lopez MD

## 2018-02-25 NOTE — PROGRESS NOTES
Pt here for pre-op for total laparoscopic hysterectomy.  Please see pre-op H&P for complete details.    Aamir Lopez MD

## 2018-03-01 ENCOUNTER — LAB VISIT (OUTPATIENT)
Dept: LAB | Facility: HOSPITAL | Age: 43
End: 2018-03-01
Attending: OBSTETRICS & GYNECOLOGY
Payer: COMMERCIAL

## 2018-03-01 DIAGNOSIS — Z01.818 PRE-OP TESTING: ICD-10-CM

## 2018-03-01 LAB
ABO + RH BLD: NORMAL
B-HCG UR QL: NEGATIVE
BLD GP AB SCN CELLS X3 SERPL QL: NORMAL

## 2018-03-01 PROCEDURE — 86901 BLOOD TYPING SEROLOGIC RH(D): CPT

## 2018-03-01 PROCEDURE — 81025 URINE PREGNANCY TEST: CPT

## 2018-03-01 PROCEDURE — 36415 COLL VENOUS BLD VENIPUNCTURE: CPT

## 2018-03-02 ENCOUNTER — SURGERY (OUTPATIENT)
Age: 43
End: 2018-03-02

## 2018-03-02 ENCOUNTER — ANESTHESIA EVENT (OUTPATIENT)
Dept: SURGERY | Facility: HOSPITAL | Age: 43
End: 2018-03-02
Payer: COMMERCIAL

## 2018-03-02 ENCOUNTER — ANESTHESIA (OUTPATIENT)
Dept: SURGERY | Facility: HOSPITAL | Age: 43
End: 2018-03-02
Payer: COMMERCIAL

## 2018-03-02 ENCOUNTER — HOSPITAL ENCOUNTER (OUTPATIENT)
Facility: HOSPITAL | Age: 43
Discharge: HOME | End: 2018-03-03
Attending: OBSTETRICS & GYNECOLOGY | Admitting: OBSTETRICS & GYNECOLOGY
Payer: COMMERCIAL

## 2018-03-02 DIAGNOSIS — Z90.710 S/P LAPAROSCOPIC HYSTERECTOMY: Primary | ICD-10-CM

## 2018-03-02 DIAGNOSIS — N93.9 ABNORMAL UTERINE BLEEDING (AUB): ICD-10-CM

## 2018-03-02 LAB
B-HCG UR QL: NEGATIVE
BASOPHILS NFR BLD: 0 %
DIFFERENTIAL METHOD: ABNORMAL
EOSINOPHIL NFR BLD: 0 %
ERYTHROCYTE [DISTWIDTH] IN BLOOD BY AUTOMATED COUNT: 14.2 %
HCT VFR BLD AUTO: 28.3 %
HGB BLD-MCNC: 8.9 G/DL
LYMPHOCYTES NFR BLD: 4 %
MCH RBC QN AUTO: 24.3 PG
MCHC RBC AUTO-ENTMCNC: 31.4 G/DL
MCV RBC AUTO: 77 FL
MONOCYTES NFR BLD: 6 %
NEUTROPHILS NFR BLD: 90 %
PLATELET # BLD AUTO: 144 K/UL
PMV BLD AUTO: 10.9 FL
POCT GLUCOSE: 104 MG/DL (ref 70–110)
RBC # BLD AUTO: 3.66 M/UL
WBC # BLD AUTO: 14.35 K/UL

## 2018-03-02 PROCEDURE — 36000710: Performed by: OBSTETRICS & GYNECOLOGY

## 2018-03-02 PROCEDURE — 85025 COMPLETE CBC W/AUTO DIFF WBC: CPT

## 2018-03-02 PROCEDURE — D9220A PRA ANESTHESIA: Mod: 59,CRNA,, | Performed by: NURSE ANESTHETIST, CERTIFIED REGISTERED

## 2018-03-02 PROCEDURE — C2628 CATHETER, OCCLUSION: HCPCS | Performed by: OBSTETRICS & GYNECOLOGY

## 2018-03-02 PROCEDURE — 58571 TLH W/T/O 250 G OR LESS: CPT | Mod: 80,,, | Performed by: OBSTETRICS & GYNECOLOGY

## 2018-03-02 PROCEDURE — 63600175 PHARM REV CODE 636 W HCPCS: Performed by: NURSE ANESTHETIST, CERTIFIED REGISTERED

## 2018-03-02 PROCEDURE — 58571 TLH W/T/O 250 G OR LESS: CPT | Mod: ,,, | Performed by: OBSTETRICS & GYNECOLOGY

## 2018-03-02 PROCEDURE — 36000711: Performed by: OBSTETRICS & GYNECOLOGY

## 2018-03-02 PROCEDURE — 88307 TISSUE EXAM BY PATHOLOGIST: CPT | Performed by: PATHOLOGY

## 2018-03-02 PROCEDURE — 25000003 PHARM REV CODE 250: Performed by: OBSTETRICS & GYNECOLOGY

## 2018-03-02 PROCEDURE — 36000709 HC OR TIME LEV III EA ADD 15 MIN: Performed by: OBSTETRICS & GYNECOLOGY

## 2018-03-02 PROCEDURE — 82962 GLUCOSE BLOOD TEST: CPT | Performed by: OBSTETRICS & GYNECOLOGY

## 2018-03-02 PROCEDURE — D9220A PRA ANESTHESIA: Mod: 59,ANES,, | Performed by: ANESTHESIOLOGY

## 2018-03-02 PROCEDURE — 12020 TX SUPFC WND DEHSN SMPL CLSR: CPT | Mod: 78,59,, | Performed by: OBSTETRICS & GYNECOLOGY

## 2018-03-02 PROCEDURE — 81025 URINE PREGNANCY TEST: CPT

## 2018-03-02 PROCEDURE — 25000003 PHARM REV CODE 250: Performed by: NURSE ANESTHETIST, CERTIFIED REGISTERED

## 2018-03-02 PROCEDURE — 63600175 PHARM REV CODE 636 W HCPCS: Performed by: OBSTETRICS & GYNECOLOGY

## 2018-03-02 PROCEDURE — 71000039 HC RECOVERY, EACH ADD'L HOUR: Performed by: OBSTETRICS & GYNECOLOGY

## 2018-03-02 PROCEDURE — 37000008 HC ANESTHESIA 1ST 15 MINUTES: Performed by: OBSTETRICS & GYNECOLOGY

## 2018-03-02 PROCEDURE — P9021 RED BLOOD CELLS UNIT: HCPCS

## 2018-03-02 PROCEDURE — 27201423 OPTIME MED/SURG SUP & DEVICES STERILE SUPPLY: Performed by: OBSTETRICS & GYNECOLOGY

## 2018-03-02 PROCEDURE — D9220A PRA ANESTHESIA: Mod: ANES,,, | Performed by: ANESTHESIOLOGY

## 2018-03-02 PROCEDURE — 71000033 HC RECOVERY, INTIAL HOUR: Performed by: OBSTETRICS & GYNECOLOGY

## 2018-03-02 PROCEDURE — D9220A PRA ANESTHESIA: Mod: CRNA,,, | Performed by: NURSE ANESTHETIST, CERTIFIED REGISTERED

## 2018-03-02 PROCEDURE — 88307 TISSUE EXAM BY PATHOLOGIST: CPT | Mod: 26,,, | Performed by: PATHOLOGY

## 2018-03-02 PROCEDURE — 00840 ANES IPER PX LOWER ABD NOS: CPT | Performed by: OBSTETRICS & GYNECOLOGY

## 2018-03-02 PROCEDURE — 27100025 HC TUBING, SET FLUID WARMER: Performed by: NURSE ANESTHETIST, CERTIFIED REGISTERED

## 2018-03-02 PROCEDURE — 86920 COMPATIBILITY TEST SPIN: CPT

## 2018-03-02 PROCEDURE — 36000708 HC OR TIME LEV III 1ST 15 MIN: Performed by: OBSTETRICS & GYNECOLOGY

## 2018-03-02 PROCEDURE — 37000009 HC ANESTHESIA EA ADD 15 MINS: Performed by: OBSTETRICS & GYNECOLOGY

## 2018-03-02 PROCEDURE — 36415 COLL VENOUS BLD VENIPUNCTURE: CPT

## 2018-03-02 RX ORDER — PHENYLEPHRINE HYDROCHLORIDE 10 MG/ML
INJECTION INTRAVENOUS
Status: DISCONTINUED | OUTPATIENT
Start: 2018-03-02 | End: 2018-03-02

## 2018-03-02 RX ORDER — LORAZEPAM 2 MG/ML
0.25 INJECTION INTRAMUSCULAR ONCE AS NEEDED
Status: DISCONTINUED | OUTPATIENT
Start: 2018-03-02 | End: 2018-03-02 | Stop reason: HOSPADM

## 2018-03-02 RX ORDER — CEPHALEXIN 500 MG/1
500 CAPSULE ORAL 3 TIMES DAILY
Qty: 21 CAPSULE | Refills: 0 | Status: SHIPPED | OUTPATIENT
Start: 2018-03-02 | End: 2018-03-09

## 2018-03-02 RX ORDER — FENTANYL CITRATE 50 UG/ML
INJECTION, SOLUTION INTRAMUSCULAR; INTRAVENOUS
Status: DISCONTINUED | OUTPATIENT
Start: 2018-03-02 | End: 2018-03-02

## 2018-03-02 RX ORDER — SODIUM CHLORIDE 0.9 % (FLUSH) 0.9 %
3 SYRINGE (ML) INJECTION
Status: DISCONTINUED | OUTPATIENT
Start: 2018-03-02 | End: 2018-03-03 | Stop reason: HOSPADM

## 2018-03-02 RX ORDER — LIDOCAINE HCL/PF 100 MG/5ML
SYRINGE (ML) INTRAVENOUS
Status: DISCONTINUED | OUTPATIENT
Start: 2018-03-02 | End: 2018-03-02

## 2018-03-02 RX ORDER — NEOSTIGMINE METHYLSULFATE 1 MG/ML
INJECTION, SOLUTION INTRAVENOUS
Status: DISCONTINUED | OUTPATIENT
Start: 2018-03-02 | End: 2018-03-02

## 2018-03-02 RX ORDER — OXYCODONE AND ACETAMINOPHEN 10; 325 MG/1; MG/1
1 TABLET ORAL EVERY 4 HOURS PRN
Status: DISCONTINUED | OUTPATIENT
Start: 2018-03-02 | End: 2018-03-03 | Stop reason: HOSPADM

## 2018-03-02 RX ORDER — HYDROCODONE BITARTRATE AND ACETAMINOPHEN 5; 325 MG/1; MG/1
1 TABLET ORAL EVERY 4 HOURS PRN
Status: DISCONTINUED | OUTPATIENT
Start: 2018-03-02 | End: 2018-03-03 | Stop reason: HOSPADM

## 2018-03-02 RX ORDER — SODIUM CHLORIDE, SODIUM LACTATE, POTASSIUM CHLORIDE, CALCIUM CHLORIDE 600; 310; 30; 20 MG/100ML; MG/100ML; MG/100ML; MG/100ML
INJECTION, SOLUTION INTRAVENOUS CONTINUOUS
Status: DISCONTINUED | OUTPATIENT
Start: 2018-03-02 | End: 2018-03-03 | Stop reason: HOSPADM

## 2018-03-02 RX ORDER — ONDANSETRON 2 MG/ML
INJECTION INTRAMUSCULAR; INTRAVENOUS
Status: DISCONTINUED | OUTPATIENT
Start: 2018-03-02 | End: 2018-03-02

## 2018-03-02 RX ORDER — SUCCINYLCHOLINE CHLORIDE 20 MG/ML
INJECTION INTRAMUSCULAR; INTRAVENOUS
Status: DISCONTINUED | OUTPATIENT
Start: 2018-03-02 | End: 2018-03-02

## 2018-03-02 RX ORDER — SODIUM CHLORIDE, SODIUM LACTATE, POTASSIUM CHLORIDE, CALCIUM CHLORIDE 600; 310; 30; 20 MG/100ML; MG/100ML; MG/100ML; MG/100ML
INJECTION, SOLUTION INTRAVENOUS CONTINUOUS PRN
Status: DISCONTINUED | OUTPATIENT
Start: 2018-03-02 | End: 2018-03-02

## 2018-03-02 RX ORDER — FERROUS SULFATE 325(65) MG
325 TABLET ORAL 2 TIMES DAILY
Qty: 60 TABLET | Refills: 1 | Status: SHIPPED | OUTPATIENT
Start: 2018-03-02 | End: 2018-04-20 | Stop reason: ALTCHOICE

## 2018-03-02 RX ORDER — ROCURONIUM BROMIDE 10 MG/ML
INJECTION, SOLUTION INTRAVENOUS
Status: DISCONTINUED | OUTPATIENT
Start: 2018-03-02 | End: 2018-03-02

## 2018-03-02 RX ORDER — ETOMIDATE 2 MG/ML
INJECTION INTRAVENOUS
Status: DISCONTINUED | OUTPATIENT
Start: 2018-03-02 | End: 2018-03-02

## 2018-03-02 RX ORDER — GLYCOPYRROLATE 0.2 MG/ML
INJECTION INTRAMUSCULAR; INTRAVENOUS
Status: DISCONTINUED | OUTPATIENT
Start: 2018-03-02 | End: 2018-03-02

## 2018-03-02 RX ORDER — IBUPROFEN 600 MG/1
600 TABLET ORAL EVERY 6 HOURS PRN
Status: DISCONTINUED | OUTPATIENT
Start: 2018-03-02 | End: 2018-03-02

## 2018-03-02 RX ORDER — CEFAZOLIN SODIUM 2 G/50ML
2 SOLUTION INTRAVENOUS
Status: COMPLETED | OUTPATIENT
Start: 2018-03-02 | End: 2018-03-02

## 2018-03-02 RX ORDER — OXYCODONE AND ACETAMINOPHEN 5; 325 MG/1; MG/1
1 TABLET ORAL EVERY 4 HOURS PRN
Qty: 30 TABLET | Refills: 0 | Status: SHIPPED | OUTPATIENT
Start: 2018-03-02 | End: 2018-04-20 | Stop reason: SINTOL

## 2018-03-02 RX ORDER — PROPOFOL 10 MG/ML
VIAL (ML) INTRAVENOUS
Status: DISCONTINUED | OUTPATIENT
Start: 2018-03-02 | End: 2018-03-02

## 2018-03-02 RX ORDER — ONDANSETRON 8 MG/1
8 TABLET, ORALLY DISINTEGRATING ORAL EVERY 8 HOURS PRN
Status: DISCONTINUED | OUTPATIENT
Start: 2018-03-02 | End: 2018-03-03 | Stop reason: HOSPADM

## 2018-03-02 RX ORDER — IBUPROFEN 600 MG/1
600 TABLET ORAL EVERY 6 HOURS PRN
Qty: 60 TABLET | Refills: 0 | Status: SHIPPED | OUTPATIENT
Start: 2018-03-02

## 2018-03-02 RX ORDER — MIDAZOLAM HYDROCHLORIDE 1 MG/ML
INJECTION, SOLUTION INTRAMUSCULAR; INTRAVENOUS
Status: DISCONTINUED | OUTPATIENT
Start: 2018-03-02 | End: 2018-03-02

## 2018-03-02 RX ORDER — DOCUSATE SODIUM 100 MG/1
100 CAPSULE, LIQUID FILLED ORAL 2 TIMES DAILY PRN
Qty: 60 CAPSULE | Refills: 1 | Status: SHIPPED | OUTPATIENT
Start: 2018-03-02 | End: 2018-04-20 | Stop reason: ALTCHOICE

## 2018-03-02 RX ORDER — ONDANSETRON 2 MG/ML
4 INJECTION INTRAMUSCULAR; INTRAVENOUS ONCE AS NEEDED
Status: ACTIVE | OUTPATIENT
Start: 2018-03-02 | End: 2018-03-02

## 2018-03-02 RX ORDER — CEFAZOLIN SODIUM 1 G/50ML
1 SOLUTION INTRAVENOUS ONCE
Status: COMPLETED | OUTPATIENT
Start: 2018-03-02 | End: 2018-03-02

## 2018-03-02 RX ORDER — HYDROMORPHONE HYDROCHLORIDE 2 MG/ML
0.2 INJECTION, SOLUTION INTRAMUSCULAR; INTRAVENOUS; SUBCUTANEOUS EVERY 5 MIN PRN
Status: DISCONTINUED | OUTPATIENT
Start: 2018-03-02 | End: 2018-03-03 | Stop reason: HOSPADM

## 2018-03-02 RX ORDER — HYDROMORPHONE HYDROCHLORIDE 2 MG/ML
0.2 INJECTION, SOLUTION INTRAMUSCULAR; INTRAVENOUS; SUBCUTANEOUS EVERY 5 MIN PRN
Status: DISCONTINUED | OUTPATIENT
Start: 2018-03-02 | End: 2018-03-02 | Stop reason: HOSPADM

## 2018-03-02 RX ORDER — FENTANYL CITRATE 50 UG/ML
25 INJECTION, SOLUTION INTRAMUSCULAR; INTRAVENOUS EVERY 5 MIN PRN
Status: DISCONTINUED | OUTPATIENT
Start: 2018-03-02 | End: 2018-03-02 | Stop reason: HOSPADM

## 2018-03-02 RX ORDER — ACETAMINOPHEN 10 MG/ML
INJECTION, SOLUTION INTRAVENOUS
Status: DISCONTINUED | OUTPATIENT
Start: 2018-03-02 | End: 2018-03-02

## 2018-03-02 RX ORDER — SIMETHICONE 80 MG
80 TABLET,CHEWABLE ORAL EVERY 4 HOURS PRN
Status: DISCONTINUED | OUTPATIENT
Start: 2018-03-02 | End: 2018-03-03 | Stop reason: HOSPADM

## 2018-03-02 RX ORDER — SODIUM CHLORIDE 0.9 % (FLUSH) 0.9 %
3 SYRINGE (ML) INJECTION
Status: DISCONTINUED | OUTPATIENT
Start: 2018-03-02 | End: 2018-03-02 | Stop reason: HOSPADM

## 2018-03-02 RX ORDER — DIPHENHYDRAMINE HCL 25 MG
25 CAPSULE ORAL EVERY 4 HOURS PRN
Status: DISCONTINUED | OUTPATIENT
Start: 2018-03-02 | End: 2018-03-03 | Stop reason: HOSPADM

## 2018-03-02 RX ADMIN — ONDANSETRON 4 MG: 2 INJECTION, SOLUTION INTRAMUSCULAR; INTRAVENOUS at 08:03

## 2018-03-02 RX ADMIN — ETOMIDATE 15 MG: 2 INJECTION, SOLUTION INTRAVENOUS at 05:03

## 2018-03-02 RX ADMIN — PHENYLEPHRINE HYDROCHLORIDE 100 MCG: 10 INJECTION INTRAVENOUS at 09:03

## 2018-03-02 RX ADMIN — PHENYLEPHRINE HYDROCHLORIDE 200 MCG: 10 INJECTION INTRAVENOUS at 08:03

## 2018-03-02 RX ADMIN — ROCURONIUM BROMIDE 15 MG: 10 INJECTION, SOLUTION INTRAVENOUS at 08:03

## 2018-03-02 RX ADMIN — PHENYLEPHRINE HYDROCHLORIDE 200 MCG: 10 INJECTION INTRAVENOUS at 09:03

## 2018-03-02 RX ADMIN — LIDOCAINE HYDROCHLORIDE 100 MG: 20 INJECTION, SOLUTION INTRAVENOUS at 07:03

## 2018-03-02 RX ADMIN — FENTANYL CITRATE 100 MCG: 50 INJECTION INTRAMUSCULAR; INTRAVENOUS at 05:03

## 2018-03-02 RX ADMIN — NEOSTIGMINE METHYLSULFATE 4 MG: 1 INJECTION INTRAVENOUS at 09:03

## 2018-03-02 RX ADMIN — FENTANYL CITRATE 50 MCG: 50 INJECTION INTRAMUSCULAR; INTRAVENOUS at 09:03

## 2018-03-02 RX ADMIN — ROCURONIUM BROMIDE 10 MG: 10 INJECTION, SOLUTION INTRAVENOUS at 06:03

## 2018-03-02 RX ADMIN — SODIUM CHLORIDE, SODIUM LACTATE, POTASSIUM CHLORIDE, AND CALCIUM CHLORIDE: .6; .31; .03; .02 INJECTION, SOLUTION INTRAVENOUS at 05:03

## 2018-03-02 RX ADMIN — CEFAZOLIN SODIUM 1 G: 1 SOLUTION INTRAVENOUS at 05:03

## 2018-03-02 RX ADMIN — FENTANYL CITRATE 100 MCG: 50 INJECTION INTRAMUSCULAR; INTRAVENOUS at 07:03

## 2018-03-02 RX ADMIN — GLYCOPYRROLATE 0.1 MG: 0.2 INJECTION, SOLUTION INTRAMUSCULAR; INTRAVENOUS at 06:03

## 2018-03-02 RX ADMIN — SUCCINYLCHOLINE CHLORIDE 100 MG: 20 INJECTION, SOLUTION INTRAMUSCULAR; INTRAVENOUS at 05:03

## 2018-03-02 RX ADMIN — NEOSTIGMINE METHYLSULFATE 2.5 MG: 1 INJECTION INTRAVENOUS at 06:03

## 2018-03-02 RX ADMIN — PHENYLEPHRINE HYDROCHLORIDE 100 MCG: 10 INJECTION INTRAVENOUS at 08:03

## 2018-03-02 RX ADMIN — GLYCOPYRROLATE 0.2 MG: 0.2 INJECTION, SOLUTION INTRAMUSCULAR; INTRAVENOUS at 09:03

## 2018-03-02 RX ADMIN — ETOMIDATE 5 MG: 2 INJECTION, SOLUTION INTRAVENOUS at 05:03

## 2018-03-02 RX ADMIN — MIDAZOLAM HYDROCHLORIDE 2 MG: 1 INJECTION, SOLUTION INTRAMUSCULAR; INTRAVENOUS at 07:03

## 2018-03-02 RX ADMIN — MIDAZOLAM HYDROCHLORIDE 2 MG: 1 INJECTION, SOLUTION INTRAMUSCULAR; INTRAVENOUS at 05:03

## 2018-03-02 RX ADMIN — PHENYLEPHRINE HYDROCHLORIDE 200 MCG: 10 INJECTION INTRAVENOUS at 05:03

## 2018-03-02 RX ADMIN — GLYCOPYRROLATE 0.6 MG: 0.2 INJECTION, SOLUTION INTRAMUSCULAR; INTRAVENOUS at 09:03

## 2018-03-02 RX ADMIN — PROPOFOL 150 MG: 10 INJECTION, EMULSION INTRAVENOUS at 07:03

## 2018-03-02 RX ADMIN — FENTANYL CITRATE 50 MCG: 50 INJECTION INTRAMUSCULAR; INTRAVENOUS at 06:03

## 2018-03-02 RX ADMIN — PHENYLEPHRINE HYDROCHLORIDE 100 MCG: 10 INJECTION INTRAVENOUS at 06:03

## 2018-03-02 RX ADMIN — ROCURONIUM BROMIDE 10 MG: 10 INJECTION, SOLUTION INTRAVENOUS at 08:03

## 2018-03-02 RX ADMIN — ACETAMINOPHEN 1000 MG: 10 INJECTION, SOLUTION INTRAVENOUS at 07:03

## 2018-03-02 RX ADMIN — SODIUM CHLORIDE, SODIUM LACTATE, POTASSIUM CHLORIDE, AND CALCIUM CHLORIDE: .6; .31; .03; .02 INJECTION, SOLUTION INTRAVENOUS at 09:03

## 2018-03-02 RX ADMIN — ROCURONIUM BROMIDE 5 MG: 10 INJECTION, SOLUTION INTRAVENOUS at 05:03

## 2018-03-02 RX ADMIN — GLYCOPYRROLATE 0.2 MG: 0.2 INJECTION, SOLUTION INTRAMUSCULAR; INTRAVENOUS at 08:03

## 2018-03-02 RX ADMIN — SODIUM CHLORIDE, SODIUM LACTATE, POTASSIUM CHLORIDE, AND CALCIUM CHLORIDE: .6; .31; .03; .02 INJECTION, SOLUTION INTRAVENOUS at 07:03

## 2018-03-02 RX ADMIN — SUCCINYLCHOLINE CHLORIDE 100 MG: 20 INJECTION, SOLUTION INTRAMUSCULAR; INTRAVENOUS at 07:03

## 2018-03-02 RX ADMIN — ROCURONIUM BROMIDE 25 MG: 10 INJECTION, SOLUTION INTRAVENOUS at 05:03

## 2018-03-02 RX ADMIN — FENTANYL CITRATE 50 MCG: 50 INJECTION INTRAMUSCULAR; INTRAVENOUS at 05:03

## 2018-03-02 RX ADMIN — ROCURONIUM BROMIDE 5 MG: 10 INJECTION, SOLUTION INTRAVENOUS at 06:03

## 2018-03-02 RX ADMIN — ONDANSETRON 4 MG: 2 INJECTION, SOLUTION INTRAMUSCULAR; INTRAVENOUS at 05:03

## 2018-03-02 RX ADMIN — CEFAZOLIN SODIUM 2 G: 2 SOLUTION INTRAVENOUS at 07:03

## 2018-03-02 RX ADMIN — ROCURONIUM BROMIDE 10 MG: 10 INJECTION, SOLUTION INTRAVENOUS at 07:03

## 2018-03-02 RX ADMIN — GLYCOPYRROLATE 0.2 MG: 0.2 INJECTION, SOLUTION INTRAMUSCULAR; INTRAVENOUS at 06:03

## 2018-03-02 RX ADMIN — ROCURONIUM BROMIDE 25 MG: 10 INJECTION, SOLUTION INTRAVENOUS at 07:03

## 2018-03-02 NOTE — ANESTHESIA PREPROCEDURE EVALUATION
03/02/2018  Nuria Franklin is a 42 y.o., female.    Anesthesia Evaluation     I have reviewed the Nursing Notes.      Review of Systems  Anesthesia Hx:  No problems with previous Anesthesia   Social:  Non-Smoker    Cardiovascular:   Exercise tolerance: good Denies Pacemaker. Hypertension  Denies Valvular problems/Murmurs.  Denies MI.  Denies CAD.    Denies CABG/stent.  Denies Dysrhythmias.   Denies Angina.             denies PVD no hyperlipidemia        Pulmonary:  Pulmonary Normal    Renal/:  Renal/ Normal     Hepatic/GI:  Hepatic/GI Normal    Neurological:  Neurology Normal    Endocrine:  Endocrine Normal        Physical Exam  General:  Obesity    Airway/Jaw/Neck:  AIRWAY FINDINGS: Normal           Mental Status:  Mental Status Findings: Normal        Anesthesia Plan  Type of Anesthesia, risks & benefits discussed:  Anesthesia Type:  general  Patient's Preference:   Intra-op Monitoring Plan: standard ASA monitors  Intra-op Monitoring Plan Comments:   Post Op Pain Control Plan:   Post Op Pain Control Plan Comments:   Induction:   IV  Beta Blocker:  Patient is not currently on a Beta-Blocker (No further documentation required).       Informed Consent: Patient understands risks and agrees with Anesthesia plan.  Questions answered. Anesthesia consent signed with patient.  ASA Score: 2     Day of Surgery Review of History & Physical:    H&P update referred to the surgeon.         Ready For Surgery From Anesthesia Perspective.

## 2018-03-02 NOTE — PROGRESS NOTES
Dr. Lopez at bedside.  Continuous Pulse Ox and BP every 15 minutes for 2 hours.  STAT CBC ordered and T/S ordered.  PT denies any needs and no distress noted at this time.  Will continue to closely monitor.

## 2018-03-02 NOTE — PROGRESS NOTES
Dr. Lopez notified pt saturated arturo pad and 1/2 green bed pad within 45 minutes bright red blood noted.  Orders given to change pads and notify him within the hour if saturated again.  Charge RN Pennie also aware.

## 2018-03-02 NOTE — PROGRESS NOTES
Called Dr. Lopez back SBP approximately one hour ago 149 now .  Advised he will be on the unit to assess pt.

## 2018-03-02 NOTE — PROGRESS NOTES
Dr. Lopez notified pt passed 330cc clot.  VSS.  No distress noted at this time. He advised he is on his way to assess pt.

## 2018-03-02 NOTE — TRANSFER OF CARE
"Anesthesia Transfer of Care Note    Patient: Nuria Franklin    Procedure(s) Performed: Procedure(s) (LRB):  HYSTERECTOMY-TOTAL LAPAROSCOPIC (TLH) (N/A)  CYSTOSCOPY (N/A)    Patient location: PACU    Anesthesia Type: general    Transport from OR: Transported from OR on room air with adequate spontaneous ventilation    Post pain: adequate analgesia    Post assessment: no apparent anesthetic complications and tolerated procedure well    Post vital signs: stable    Level of consciousness: sedated and responds to stimulation    Nausea/Vomiting: no nausea/vomiting    Complications: none    Transfer of care protocol was followed      Last vitals:   Visit Vitals  /68 (BP Location: Right arm, Patient Position: Lying)   Pulse (!) 59   Temp 36.6 °C (97.9 °F) (Oral)   Resp 12   Ht 5' 6" (1.676 m)   Wt 84.4 kg (186 lb)   LMP 02/17/2018   SpO2 100%   BMI 30.02 kg/m²     "

## 2018-03-03 VITALS
OXYGEN SATURATION: 100 % | TEMPERATURE: 99 F | DIASTOLIC BLOOD PRESSURE: 74 MMHG | BODY MASS INDEX: 29.89 KG/M2 | RESPIRATION RATE: 18 BRPM | SYSTOLIC BLOOD PRESSURE: 144 MMHG | HEART RATE: 75 BPM | HEIGHT: 66 IN | WEIGHT: 186 LBS

## 2018-03-03 LAB
ANION GAP SERPL CALC-SCNC: 5 MMOL/L
BASOPHILS # BLD AUTO: 0.02 K/UL
BASOPHILS NFR BLD: 0.2 %
BLD PROD TYP BPU: NORMAL
BLOOD UNIT EXPIRATION DATE: NORMAL
BLOOD UNIT TYPE CODE: 5100
BLOOD UNIT TYPE: NORMAL
BUN SERPL-MCNC: 8 MG/DL
CALCIUM SERPL-MCNC: 8.5 MG/DL
CHLORIDE SERPL-SCNC: 109 MMOL/L
CO2 SERPL-SCNC: 24 MMOL/L
CODING SYSTEM: NORMAL
CREAT SERPL-MCNC: 0.8 MG/DL
DIFFERENTIAL METHOD: ABNORMAL
DISPENSE STATUS: NORMAL
EOSINOPHIL # BLD AUTO: 0 K/UL
EOSINOPHIL NFR BLD: 0.2 %
ERYTHROCYTE [DISTWIDTH] IN BLOOD BY AUTOMATED COUNT: 14.2 %
EST. GFR  (AFRICAN AMERICAN): >60 ML/MIN/1.73 M^2
EST. GFR  (NON AFRICAN AMERICAN): >60 ML/MIN/1.73 M^2
GLUCOSE SERPL-MCNC: 108 MG/DL
HCT VFR BLD AUTO: 22.7 %
HGB BLD-MCNC: 7.3 G/DL
LYMPHOCYTES # BLD AUTO: 1.1 K/UL
LYMPHOCYTES NFR BLD: 12.9 %
MCH RBC QN AUTO: 25.1 PG
MCHC RBC AUTO-ENTMCNC: 32.2 G/DL
MCV RBC AUTO: 78 FL
MONOCYTES # BLD AUTO: 0.8 K/UL
MONOCYTES NFR BLD: 9.3 %
NEUTROPHILS # BLD AUTO: 6.6 K/UL
NEUTROPHILS NFR BLD: 77.1 %
NUM UNITS TRANS PACKED RBC: NORMAL
PLATELET # BLD AUTO: 121 K/UL
PMV BLD AUTO: 12.1 FL
POTASSIUM SERPL-SCNC: 3.7 MMOL/L
RBC # BLD AUTO: 2.91 M/UL
SODIUM SERPL-SCNC: 138 MMOL/L
TRANS ERYTHROCYTES VOL PATIENT: NORMAL ML
TRANS ERYTHROCYTES VOL PATIENT: NORMAL ML
WBC # BLD AUTO: 8.61 K/UL

## 2018-03-03 PROCEDURE — 85025 COMPLETE CBC W/AUTO DIFF WBC: CPT

## 2018-03-03 PROCEDURE — 80048 BASIC METABOLIC PNL TOTAL CA: CPT

## 2018-03-03 PROCEDURE — 36415 COLL VENOUS BLD VENIPUNCTURE: CPT

## 2018-03-03 PROCEDURE — 25000003 PHARM REV CODE 250: Performed by: OBSTETRICS & GYNECOLOGY

## 2018-03-03 RX ADMIN — SODIUM CHLORIDE, SODIUM LACTATE, POTASSIUM CHLORIDE, AND CALCIUM CHLORIDE: .6; .31; .03; .02 INJECTION, SOLUTION INTRAVENOUS at 07:03

## 2018-03-03 NOTE — PROGRESS NOTES
Patient resting comfortably and requesting apple juice. Instructed on use of incentive spirometer. Return demonstrated without issue x 10. Instructed to use 10x/hour while awake. Understanding voiced. No vaginal bleeding noted. Gonsalez cath checked and draining yellow urine. No signs of distress or complaints of pain. Significant other at bedside.

## 2018-03-03 NOTE — ANESTHESIA POSTPROCEDURE EVALUATION
"Anesthesia Post Evaluation    Patient: Nuria Franklin    Procedure(s) Performed: Procedure(s) (LRB):  EXAM UNDER ANESTHESIA (N/A)  LAPAROSCOPY-DIAGNOSTIC (N/A)    Final Anesthesia Type: general  Patient location during evaluation: PACU  Patient participation: Yes- Able to Participate  Level of consciousness: awake and alert and oriented  Post-procedure vital signs: reviewed and stable  Pain management: adequate  Airway patency: patent  PONV status at discharge: No PONV  Anesthetic complications: no      Cardiovascular status: blood pressure returned to baseline and hemodynamically stable  Respiratory status: unassisted, spontaneous ventilation and room air  Hydration status: euvolemic  Follow-up not needed.        Visit Vitals  BP (!) 150/96   Pulse 88   Temp 36.8 °C (98.2 °F) (Oral)   Resp 16   Ht 5' 6" (1.676 m)   Wt 84.4 kg (186 lb)   LMP 02/17/2018   SpO2 100%   BMI 30.02 kg/m²       Pain/Ezequiel Score: Pain Assessment Performed: Yes (3/2/2018  6:03 AM)  Presence of Pain: denies (3/2/2018  7:00 PM)  Pain Rating Prior to Med Admin: 0 (3/2/2018  7:00 PM)  Ezequiel Score: 8 (3/2/2018  7:00 PM)      "

## 2018-03-03 NOTE — PROGRESS NOTES
"[facility]    Obstetrics & Gynecology  Progress Note      Patient Name:  Nuria Franklin  MRN:  [id number]  Admission Date:  3/2/2018  Hospital Length of Stay:  0  Attending Physician:  Aamir Lopez MD    Subjective:     Date:  03/03/2018    Principal Problem:  Abnormal uterine bleeding (AUB)    Hospital Course:  Post-op Day # 1 - s/p TLH, bilateral salpingectomy.  Postop, pt had examination under anesthesia and laparoscopy for vaginal bleeding.    Patient without major complaints  No acute events overnight  Requesting to go home  Pain well controlled  Tolerating regular diet  Positive flatus  Ambulating and voiding without difficulty  No active vaginal bleeding  Denies weakness, dizziness, SOB, or CP    Objective:     Temp:  [96.5 °F (35.8 °C)-99.1 °F (37.3 °C)] 98.9 °F (37.2 °C)  Pulse:  [] 75  Resp:  [16-20] 18  SpO2:  [98 %-100 %] 100 %  BP: (113-151)/(56-97) 144/74  No orthostatic    BP (!) 144/74 (BP Location: Right arm, Patient Position: Lying)   Pulse 75   Temp 98.9 °F (37.2 °C) (Oral)   Resp 18   Ht 5' 6" (1.676 m)   Wt 84.4 kg (186 lb)   LMP 02/17/2018   SpO2 100%   BMI 30.02 kg/m²     Intake/Output Summary (Last 24 hours) at 03/03/18 1308  Last data filed at 03/03/18 1202   Gross per 24 hour   Intake             2491 ml   Output             2550 ml   Net              -59 ml   Clear, johnny urine    Physical Exam:   Constitutional: No distress.                             AOx3  HEENT:  No scleral icterus, neck supple, moist mucus membranes   LUNGS:  CTA-B  HEART:  RRR no m/g/r  ABDOMEN:  Soft, appropriately tender, non-distended, no guarding, rigidity, or rebound with normoactive bowel sounds.  INCISION: Clean, dry, & intact  EXTREMITIES:  Symmetric, no cramping, claudication, or edema. +2 distal pulses.  SCD's in place.  NEUROLOGIC:  Grossly intact bilaterally  PSYCH:  Mood and affect appropriate  PERINEUM:  Intact with no vaginal bleeding    Labs:      Recent " Results (from the past 336 hour(s))   CBC auto differential    Collection Time: 18  7:14 AM   Result Value Ref Range    WBC 8.61 3.90 - 12.70 K/uL    Hemoglobin 7.3 (L) 12.0 - 16.0 g/dL    Hematocrit 22.7 (L) 37.0 - 48.5 %    Platelets 121 (L) 150 - 350 K/uL   CBC auto differential    Collection Time: 18  2:28 PM   Result Value Ref Range    WBC 14.35 (H) 3.90 - 12.70 K/uL    Hemoglobin 8.9 (L) 12.0 - 16.0 g/dL    Hematocrit 28.3 (L) 37.0 - 48.5 %    Platelets 144 (L) 150 - 350 K/uL   CBC auto differential    Collection Time: 18  4:00 PM   Result Value Ref Range    WBC 5.30 3.90 - 12.70 K/uL    Hemoglobin 9.4 (L) 12.0 - 16.0 g/dL    Hematocrit 29.9 (L) 37.0 - 48.5 %    Platelets 215 150 - 350 K/uL     Assessment:     1. Post-op day # 1 - 42 y.o.  s/p TLH, bilateral salpingectomy  2. Postop, pt had examination under anesthesia and laparoscopy for postop vaginal bleeding  3. Anemia, Fe deficiency, acute blood loss expected postop, s/p 1 unit intra-op, hemodynamically stable     Plan:     Plan for discharge today.    Iron supplementation, anemia precautions, pt declined further blood transfusion.    Post-op care instructions and precautions, surgical findings/pathology, labs/studies, and hospital course reviewed with the patient prior to discharge.  Review discharge medications.  The patient was instructed to avoid driving or operate heavy machinery while taking narcotics or other medications with sedative properties.  All of her questions were answered to her satisfaction.  The patient voiced understanding and agrees with hospital discharge.    Return to the clinic in 2 weeks for post-op visit or sooner if any concerns.  Go to ER for any emergencies.    Disposition:  As patient meets milestones, will plan to discharge today.      Aamir Lopez MD

## 2018-03-03 NOTE — ANESTHESIA PREPROCEDURE EVALUATION
03/02/2018  Nuria Franklin is a 42 y.o., female who had a TLH this morning, return to OR for EUA to identify suspected vaginal cuff bleed. Patient is stable, but tachycardic and hypotensive from baseline.   Obtained anesthesia and blood consent.    Pre-op Assessment     I have reviewed the Nursing Notes.      Review of Systems  Anesthesia Hx:  No problems with previous Anesthesia  History of prior surgery of interest to airway management or planning:  Denies Personal Hx of Anesthesia complications.   Social:  Non-Smoker    Hematology/Oncology:         -- Anemia:   Cardiovascular:   Exercise tolerance: good Denies Pacemaker. Hypertension  Denies Valvular problems/Murmurs.  Denies MI.  Denies CAD.    Denies CABG/stent.  Denies Dysrhythmias.   Denies Angina.             denies PVD no hyperlipidemia        Pulmonary:  Pulmonary Normal    Renal/:  Renal/ Normal     Hepatic/GI:  Hepatic/GI Normal    Neurological:  Neurology Normal    Endocrine:  Endocrine Normal        Physical Exam  General:  Obesity    Airway/Jaw/Neck:  Airway Findings: Mouth Opening: Normal Tongue: Normal  Mallampati: II  TM Distance: Normal, at least 6 cm      Dental:  Dental Findings: Prominent Incisors   Chest/Lungs:  Chest/Lungs Findings: Clear to auscultation     Heart/Vascular:  Heart Findings: Rate: Tachycardia  Rhythm: Regular Rhythm        Mental Status:  Mental Status Findings: Normal        Anesthesia Plan  Type of Anesthesia, risks & benefits discussed:  Anesthesia Type:  general  Patient's Preference:   Intra-op Monitoring Plan: standard ASA monitors  Intra-op Monitoring Plan Comments:   Post Op Pain Control Plan: multimodal analgesia, IV/PO Opioids PRN and per primary service following discharge from PACU  Post Op Pain Control Plan Comments:   Induction:   IV  Beta Blocker:  Patient is not currently on a Beta-Blocker (No  further documentation required).       Informed Consent: Patient understands risks and agrees with Anesthesia plan.  Questions answered. Anesthesia consent signed with patient.  ASA Score: 2  emergent   Day of Surgery Review of History & Physical:    H&P update referred to the surgeon.     Anesthesia Plan Notes: NPO Adequate        Ready For Surgery From Anesthesia Perspective.

## 2018-03-03 NOTE — CARE UPDATE
Notified of vaginal bleeding.  Pt moved to bed with stirrups.  Pelvic exam performed.  Blood clots removed.  Light slow bleeding from left lateral aspect of cuff.  Cuff overall intact.  Vagina packed with kurlex, bleeding subsided.  Pt unlikely able tolerate bedside ligation of bleeding area.  D/w pt need for examination under anesthesia and possible diagnostic laparoscopy.  Informed consented signed.  Stat CBC stable.  Abdomen non-distended.  UOP adequate, low suspicion for intra-abdominal bleeding.  Recent Results (from the past 336 hour(s))   CBC auto differential    Collection Time: 03/02/18  2:28 PM   Result Value Ref Range    WBC 14.35 (H) 3.90 - 12.70 K/uL    Hemoglobin 8.9 (L) 12.0 - 16.0 g/dL    Hematocrit 28.3 (L) 37.0 - 48.5 %    Platelets 144 (L) 150 - 350 K/uL   CBC auto differential    Collection Time: 02/23/18  4:00 PM   Result Value Ref Range    WBC 5.30 3.90 - 12.70 K/uL    Hemoglobin 9.4 (L) 12.0 - 16.0 g/dL    Hematocrit 29.9 (L) 37.0 - 48.5 %    Platelets 215 150 - 350 K/uL   All questions answered, voiced understanding.  OR/anesthesia notified.      Aamir Lopez MD

## 2018-03-03 NOTE — PROGRESS NOTES
Dr. Lopez phoned unit to check on patient. Status update provided. Orders noted to remove cason catheter in AM.

## 2018-03-03 NOTE — DISCHARGE INSTRUCTIONS
Anemia  Anemia is a condition that occurs when your body does not have enough healthy red blood cells (RBCs). RBCs are the parts of your blood that carry oxygen throughout your body. A protein called hemoglobin allows your RBCs to absorb and release oxygen. Without enough RBCs or hemoglobin, your body doesn't get enough oxygen. Symptoms of anemia may then occur.    What are the symptoms of anemia?  Some people with anemia have no symptoms. But most people have symptoms that range from mild to severe. These can include:  · Tiredness (fatigue)  · Weakness  · Pale skin  · Shortness of breath  · Dizziness or fainting  · Rapid heartbeat  · Trouble doing normal amounts of activity  · Jaundice (yellowing of your eyes, skin, or mouth; dark urine)  What causes anemia?  Anemia can occur when your body:  · Loses too much blood  · Does not make enough RBCs  · Destroys your RBCs at a faster rate than it can replace them  · Does not make a normal amount of hemoglobin in your RBCs  These problems can occur for many reasons, including:  · A condition that you are born with (congenital or inherited), such as sickle cell disease or thalassemia  · Heavy bleeding for any reason, including injury, surgery, childbirth, or even heavy menstrual periods  · Being low in certain nutrients, such as iron, folate, or vitamin B12, possibly from a poor diet or a condition like celiac disease or Crohn's disease  · Certain chronic conditions like diabetes, arthritis, or kidney disease  · Certain chronic infections like tuberculosis or HIV  · Exposure to certain medicines, such as those used for chemotherapy  There are different types of anemia. Your healthcare provider can tell you more about the type of anemia you have and what may have caused it.  How is anemia diagnosed?  To diagnose anemia, your healthcare provider orders blood tests. These can include:  · Complete blood cell count (CBC). This test measures the amounts of the different types  of blood cells.  · Blood smear. This test checks the size and shape of your blood cells. To do the test, a drop of your blood is viewed under a microscope. A stain is used to make the blood cells easier to see.  · Iron studies. These tests measure the amount of iron in your blood. Your body needs iron to make hemoglobin in your RBCs.  · Vitamin B12 and folate studies. These tests check for some of the components that help give RBCs a normal size and shape.  · Reticulocyte count. This test measures the amount of new RBCs that your bone marrow makes.  · Hemoglobin electrophoresis. This test checks for problems with your hemoglobin in RBCs.  How is anemia treated?  Treatment for anemia is based on the type of anemia, its cause, and the severity of your symptoms. Treatments may include:  · Diet changes. This involves increasing the amount of certain nutrients in your diet, such as iron, vitamin B12, or folate. Your healthcare provider may also prescribe nutrient supplements.  · Medicines. Certain medicines treat the cause of your anemia. Others help build new RBCs or relieve symptoms. If a medicine is the cause of your anemia, you may need to stop or change it.  · Blood transfusions. Replacing some of your blood can increase the number of healthy RBCs in your body.  · Surgery. In some cases, your doctor may do surgery to treat the underlying cause of anemia. If you need surgery, your healthcare provider will explain the procedure and outline the risks and benefits for you.  What are the long-term concerns?  If you have a certain type of anemia, you can expect a full recovery after treatment. If you have other types of anemia (especially a type you're born with), you will need to manage it for life. Your doctor can tell you more.  Date Last Reviewed: 12/1/2016  © 9463-1317 [employer]. [address] [place] [address], [place] [address]. All rights reserved. This information is not intended as a substitute for  professional medical care. Always follow your healthcare professional's instructions.        Discharge Instructions for Laparoscopic Hysterectomy  You had a procedure called laparoscopic hysterectomy. A surgeon removed your uterus using instruments inserted through small incisions in your abdomen. These incisions may be tender or sore. You may also have pain in your upper back or shoulders. This is from the gas used to enlarge your abdomen to allow your doctor to see inside your pelvis and perform the procedure. This pain usually goes away in a day or two. It usually takes from 1 to 4 weeks to recover from laparoscopic hysterectomy. Remember, though, that recovery time varies from woman to woman. Here's what you can do to speed your recovery following surgery.  Home care   · Continue the coughing and deep breathing exercises that you learned in the hospital.  · Take your medications exactly as directed by your doctor.  · Avoid constipation.  ¨ Eat fruits, vegetables, and whole grains.  ¨ Drink 6 to 8 glasses of water a day, unless told to do otherwise.  ¨ Use a laxative or a mild stool softener if your doctor says it's OK.  · Shower as usual. Wash your incisions with mild soap and water. Pat dry.  · Don't use oils, powders, or lotions on your incisions.  · Don't put anything in your vagina until your doctor says it's safe to do so. Don't use tampons or douches. Don't have sex.  · If you had both ovaries removed, report hot flashes, mood swings, and irritability to your doctor. There may be medications that can help you.  Activity  · Ask your doctor when you can start driving again. It's usually okay to drive as soon as you are free of pain and able to move comfortably from side to side. Don't drive while you are still taking opioid pain medications.  · Ask others to help with chores and errands while you recover.  · Dont lift anything heavier than 10 pounds for 6 weeks.  · Dont vacuum or do other strenuous  activities until the doctor says it's OK.  · Walk as often as you feel able.  · Don't drive for a few days after the surgery. You may drive as soon as you are able to move comfortably from side to side and when you are no longer taking narcotics.  · Climb stairs slowly and pause after every few steps.  Follow-up care  Make a follow-up appointment as directed by our staff.     When to call your doctor  Call your doctor right away if you have any of the following:  · Fever above 100.4°F (38°C) or chills  · Bright red vaginal bleeding or vaginal bleeding that soaks more than one sanitary pad per hour  · A foul smelling discharge from the vagina  · Trouble urinating or burning when you urinate  · Severe pain or bloating in your abdomen  · Redness, swelling, or drainage at your incision sites  · Shortness of breath or chest pain  · Nausea and vomiting   Date Last Reviewed: 5/19/2015  © 8201-1760 [employer] [address] [place] [address], [place] [address]. All rights reserved. This information is not intended as a substitute for professional medical care. Always follow your healthcare professional's instructions.

## 2018-03-03 NOTE — OP NOTE
[facility]   Operative Report   Obstetrics & Gynecology     Date of Surgery:  3/2/2018     Surgeon:  Aamir Lopez MD      Preoperative diagnosis:     Post-op vaginal bleeding   S/p total laparoscopic hysterectomy and bilateral salpingectomy     Postoperative diagnosis:     Same    Procedure performed:      Examination under anesthesia  Suture ligation of bleeding points at vaginal cuff  Diagnostic laparoscopy    Anesthesia:  General      IV Fluids:  1600 mL of LR     Urine Output:  600 mL, clear prior to procedure       Estimated Blood Loss:  50 mL, pt received 1 unit of packed RBC intraop due vaginal bleeding prior surgery    Specimens:  None  Findings:  Bleeding points along the left apex vaginal cuff on vaginal exam    Complications:  No immediate complications    Indications for the Procedure:      See H&P for complete details.    In brief, Nuria Franklin is 42 y.o.  who is s/p total laparoscopic hysterectomy and bilateral salpingectomy earlier this morning.   Surgery was uncomplicated.  See total laparoscopic hysterectomy operative report for further detail.    After the hysterectomy, pt had an uneventful PACU course and transferred to mother-baby unit for overnight observation.  Approximately 5 hours surgery, pt developed vaginal bleeding.  Exam at bedside showed bleeding along the left apex of vaginal cuff.  Cuff overall intact.  Vagina packed with kurlex, bleeding subsided.  Pt unlikely able tolerate bedside ligation of bleeding points.  D/w pt need for examination under anesthesia and diagnostic laparoscopy.  Risks, benefits, and alternatives to surgery discussed.  Pt agreeable, informed consented signed.    Description of the Procedure:      Pt was taken to the operating room where a time out was performed to confirm the correct patient and correct procedure.  General anesthesia was obtained without difficulty.  Pt was placed in the dorsal lithotomy position with legs  supported in Luis Eduardo stirrups and care was taken to pad all pressure points.  A Marlyn hugger was placed to maintain control of core body temperature.  Pt was then prepped and draped in a normal sterile fashion.  A weighted speculum was placed in posterior vaginal and right angle retractor was used retract the anterior vagina. The vaginal cuff was visualized.  Bleeding points were noted along the left apex vaginal cuff.  The bleeding margins were suture ligated with interrupted 0 vicryl without difficulty.  Hemostasis noted.  Attention was then turned to the abdomen for laparoscopy.  A small vertical incision was made infraumbilical, same incision used from surgery earlier today.  The Veress needle was introduced into the peritoneum. Placement of the needle was confirmed with normal saline drop test.  Pneumoperitoneum was then established with ~3 liters of carbon dioxide and the Veress needle removed.  A 5 mm trocar was inserted through the paraumbilical incision into the peritoneum under direct visualization with the laparoscope and the pneumoperitoneum was maintained using carbon dioxide.  A 5 mm secondary port was placed in right lower quadrant.  Survery of the pelvis showed no active bleeding in pelvis.  The vaginal cuff was intact with no active bleeding.  The pneumoperitoneum was evacuated.  All instruments and ports where removed from abdomen under direct visualization.  Hemostasis was noted.  All skin incisions were closed using 4-0 vicryl.  Pt tolerated the procedure well. Sponge, lap and needle counts were correct time two.  Pt was transferred to the PACU in stable condition.  Surgery findings d/w pt.  All of her questions were answered to her satisfaction and pt voiced understanding.       Aamir Lopez MD

## 2018-03-03 NOTE — PLAN OF CARE
Problem: Patient Care Overview  Goal: Individualization & Mutuality  Outcome: Ongoing (interventions implemented as appropriate)  VSS. Afebrile. Bedrest. Three abdominal lap sites with dressing c/d/i. Scant drainage noted on middle dressing. No active bleeding noted. BS active x 4. Gonsalez catheter draining adequate yellow urine and to be discontinued this AM. Scant vaginal bleeding noted. SCDs in place. IV to LAC patent and infusing with LR. Incentive spirometer at bedside and used with encouragement. POC discussed and understanding voiced. EMILEE

## 2018-03-03 NOTE — NURSING
Up to bathroom with ass't.  Voided 600cc clear, yellow urine without diff.  Half dollar size spot noted to peripad, bu no bleeding noted as pt performed pericare.  Back to bed.  Denies complaints.  Daughter and  at bedside.  Will continue to monitor.

## 2018-03-03 NOTE — TRANSFER OF CARE
"Anesthesia Transfer of Care Note    Patient: Nuria Franklin    Procedure(s) Performed: Procedure(s) (LRB):  EXAM UNDER ANESTHESIA (N/A)  LAPAROSCOPY-DIAGNOSTIC (N/A)    Patient location: PACU    Anesthesia Type: general    Transport from OR: Transported from OR on room air with adequate spontaneous ventilation    Post pain: adequate analgesia    Post assessment: no apparent anesthetic complications and tolerated procedure well    Post vital signs: stable    Level of consciousness: awake, alert and oriented    Nausea/Vomiting: no nausea/vomiting    Complications: none    Transfer of care protocol was followed      Last vitals:   Visit Vitals  BP (!) 150/96   Pulse 88   Temp 36.8 °C (98.2 °F) (Oral)   Resp 16   Ht 5' 6" (1.676 m)   Wt 84.4 kg (186 lb)   LMP 02/17/2018   SpO2 100%   BMI 30.02 kg/m²     "

## 2018-03-03 NOTE — NURSING
"C/O left arm hurting and feeling "swollen".  Requesting both saline locks to be removed.  Removed with intact catheter.  "

## 2018-03-03 NOTE — OP NOTE
[facility]   Operative Report   Obstetrics & Gynecology     Date of Surgery:  3/2/2018     Surgeon:  Aaimr Lopez MD     Assistant:  Lenard Gonzalez M.D.       Preoperative diagnosis:     Abnormal uterine bleeding  Anemia with h/o blood transfusion  Failed medical management     Postoperative diagnosis:     Same    Procedure performed:      Total laparoscopic hysterectomy  Bilateral salpingectomy    Anesthesia:  General      IV Fluids:  1850 mL of LR     Urine Output:  350 mL, clear at end of procedure       Estimated Blood Loss:  300 mL with no replacements     Specimens: Uterus  Bilateral fallopian tubes     Findings:       Normal appearing uterus  Right ovary normal appearing  Left ovary normal appearing  Normal appearing bilateral tubes  Anterior and posterior cul-de-sac clear  Pelvic side walls clear without gross lymphadenopathy  Omentum, small and large bowel in operative field normal appearing  Liver edge with normal appearence    Complications:  None    Indications for the Procedure:      See H&P for complete details.  In brief, Nuria Franklin is a 42 y.o.  with abnormal uterine bleeding/menorrhagia with h/o anemia requiring blood transfusion requesting definitive surgical therapy with laparoscopic hysterectomy with ovarian conservation.  Pt declined further medical management or other conservative therapies.      Pt was informed of the risks, benefits, and alternatives of a laparoscopic hysterectomy.  Risks included but were not limited to bleeding, infection, injury to surrounding organs or tissues, injury to bladder and/or urinary tract, injury to bowel and/or intestinal obstruction, damage to major blood vessels, hemorrhage requiring transfusion of blood products, ovarian failure requiring hormone administration, pulmonary embolism, fistula formation, urinary and/or fecal incontinence, sexual dysfunction/pain, chronic pain, hernia, failure of wound to heal,  permanent/disfiguring scarring, and even death.  In the event of a supracervical hysterectomy, she was also counseled on the need for long term follow-up with pap smears and the possibility of future trachelectomy.  Pt was counseled extensively on the inability to become pregnant following a hysterectomy and expressed an understanding of this.  Pt was also counseled that a bilateral oophorectomy will result in surgical menopause for pre/perimenopausal women.  Pt counseled on the risks, benefits, and alternatives to ovarian conservation.  Pt was counseled in the possibility of laparotomy if extensive adhesions or bleeding were encountered.  Pt was counseled on the cancer risk associated with the use of a uterine morcellator.  Pt was counseled that hysterectomy and/or oophorectomy may not result in the resolution of her pain symptoms.  She was also counseled on the risks, benefits, and alternatives to blood transfusion.  Pt expressed an understanding of risks involved, all questions answered, and informed consent was obtained for the procedure and blood transfusion.    Description of the Procedure:      Pt was taken to the operating room where a time out was performed to confirm the correct patient and correct procedure.  Preoperative prophylactic IV antibiotics was administered prior to the procedure.  Pt was then placed in the dorsal lithotomy position with legs supported in Luis Eduardo stirrups and care was taken to pad all pressure points.  General anesthesia was obtained without difficulty.  A Marlyn hugger was placed to maintain control of core body temperature.  Pt was then prepped and draped in a normal sterile fashion.  A cason catheter was inserted.  TIMA uterine manipulator was placed without difficulty.  Attention was turned to the abdomen for laparoscopy.  A small vertical incision was made infraumbilical.  The Veress needle was introduced into the peritoneum.  Placement of the needle was confirmed with normal  saline drop test.  Pneumoperitoneum was then established with ~3 liters of carbon dioxide and the Veress needle removed.  A 11 mm trocar was inserted through the paraumbilical incision into the peritoneum without difficulty and pneumoperitoneum was then maintained using carbon dioxide.   Next, two additional small incisions where made for the secondary trocars, one in the right and left lower quadrant approximately 3 cm from iliac crest.  Two 5 mm ports where introduced under direct visualization.  Survey of the pelvis revealed the above findings.  Attention was then turned to the right side of uterus.  The uterine fundus was grasped with a single tooth tenaculum.  The pelvic courses of the ureters where identified to be well out of the surgical field.  The EnSeal was then used to coagulate and cut the round ligament followed by the utero-ovarian ligament in similar fashion.  The EnSeal was then used to coagulate and cut down the broad ligament to the uterine artery.  A bladder flap was then created and dissected across the uterus at the level of the lower uterine segment.  The bladder was pushed down.  The uterine arteries where then coagulate and cut using the EnSeal.  Attention was turned to the contralateral side of the uterus which was dissected in a similar fashion. Following bilateral ligation of the uterine vessels, the uterus appeared completely blanched.  The cardinal ligaments were identified, coagulated and cut using the EnSeal.  The uterus was then elevated using the uterine manipulator and the level of dissection was noted to be adequate for colpotomy.  The colpotomy incision was made using the Harmonic spatula and the incision was extended circumferentially.  Care was taken to avoid shortening the vaginal cuff.  The uterus was then retracted into the vagina with the uterine manipulator.  The vaginal cuff was closed laparoscopically using V-Loc absorbable suture on an endostitch in an continuous  fashion.  A bilateral salpingectomy then was performed using Enseal and removed through the umbilical port.  Inspection of vaginal cuff showed good hemostasis with no obvious evidence of injury to bladder, ureters, or bowel.  The pelvic cavity was then throughly irrigated and again hemostasis was noted.  All instruments and ports where removed from abdomen under direct visualization with the laparoscope.  The pneumoperitoneum was evacuated and the paraumbilical port was removed.  Again, hemostasis was assured.  All skin incisions were closed using 4-0 vicryl.  Pt tolerated the procedure well.  All instruments were removed from vagina.  Sponge, lap and needle counts were correct time two.  Pt was transferred to the PACU in stable condition.  Findings and expectations were later discussed with pt.  All of her questions were answered to her satisfaction and pt voiced understanding.       Aamir Lopez MD

## 2018-03-04 NOTE — DISCHARGE SUMMARY
[facility]    Obstetrics & Gynecology  Discharge Summary      Patient Name:  Nuria Franklin  MRN:  [id number]  Admission Date:  3/2/2018  Discharge Date:  3/3/2018  Hospital Length of Stay:  0  Attending Physician:  Aamir Lopez MD  Discharging Physician:  Aamir Lopez MD  Date of Surgery:  3/2/2018    Admitting Diagnosis:       Abnormal uterine bleeding/enorrhagia  H/o anemia with blood transfusion     Discharge Diagnosis:    Same    Procedure performed:      S/p total laparoscopic hysterectomy and bilateral salpingectomy  S/p examination under anesthesia, suture ligation of bleeding points at vaginal cuff, and diagnostic laparoscopy for postop vaginal bleeding    Brief Hospital Course:      See H&P for complete details.  In brief, Nuria Franklin is a 42 y.o.  with abnormal uterine bleeding/menorrhagia with h/o anemia requiring blood transfusion requesting definitive surgical therapy with laparoscopic hysterectomy with ovarian conservation.  Pt declined further medical management or other conservative therapies.    After the hysterectomy, pt had an uneventful PACU course and transferred to mother-baby unit for overnight observation.  Approximately 5 hours surgery, pt developed vaginal bleeding.  Exam at bedside showed bleeding along the left apex of vaginal cuff.  Cuff overall intact.  Vagina packed with kurlex, bleeding subsided.  Pt then underwent examination under anesthesia, suture ligation of bleeding points at vaginal cuff, and diagnostic laparoscopy for postop vaginal bleeding.  Pt was transfused 1 unit of packed RBC intraop during repair of vaginal cuff.  Otherwise, the remainder of hospital course was uncomplicated.  Prior to discharge, pt was without major complaints.  Pain was well controlled.  Pt was ambulating, tolerating a regular diet, and voiding without difficulty.  Vital signs where physiologic and stable.  Physical exam showed no acute findings.  Pt  "continued to recover well during the remainder of her hospital stay.  Pt had satisfied routine post-op discharge criteria and expressed a strong desire to be discharge from the hospital.     Pertinent Labs or Studies:      Recent Results (from the past 336 hour(s))   CBC auto differential    Collection Time: 03/03/18  7:14 AM   Result Value Ref Range    WBC 8.61 3.90 - 12.70 K/uL    Hemoglobin 7.3 (L) 12.0 - 16.0 g/dL    Hematocrit 22.7 (L) 37.0 - 48.5 %    Platelets 121 (L) 150 - 350 K/uL   CBC auto differential    Collection Time: 03/02/18  2:28 PM   Result Value Ref Range    WBC 14.35 (H) 3.90 - 12.70 K/uL    Hemoglobin 8.9 (L) 12.0 - 16.0 g/dL    Hematocrit 28.3 (L) 37.0 - 48.5 %    Platelets 144 (L) 150 - 350 K/uL   CBC auto differential    Collection Time: 02/23/18  4:00 PM   Result Value Ref Range    WBC 5.30 3.90 - 12.70 K/uL    Hemoglobin 9.4 (L) 12.0 - 16.0 g/dL    Hematocrit 29.9 (L) 37.0 - 48.5 %    Platelets 215 150 - 350 K/uL     Discharge Exam:      Temp:  [98.9 °F (37.2 °C)-99.1 °F (37.3 °C)] 98.9 °F (37.2 °C)  Pulse:  [75-87] 75  Resp:  [18] 18  BP: (113-144)/(63-74) 144/74    BP (!) 144/74 (BP Location: Right arm, Patient Position: Lying)   Pulse 75   Temp 98.9 °F (37.2 °C) (Oral)   Resp 18   Ht 5' 6" (1.676 m)   Wt 84.4 kg (186 lb)   LMP 02/17/2018   SpO2 100%   BMI 30.02 kg/m²     GENERAL:  NAD, AOx3   HEENT:  No scleral icterus, neck supple, moist mucus membranes  LUNGS:  CTA-B   HEART:  RRR no m/g/r  ABDOMEN:  Soft, appropriately tender, no guarding, rigidity, or rebound with normoactive bowel sounds.   INCISION:  Clean, dry, & intact   EXT:  No cramping, claudication or edema. +2 distal pulses. Symmetric, full range of motion.   NEURO:  Grossly intact bilaterally   PSYCH:  Mood and affect appropriate   PERINEUM:  Intact with no vaginal bleeding    Discharge Condition:  Stable      Discharge Disposition:  Home       Discharge Medications:     Motrin 600 mg 1 tab p.o. q6h prn pain, disp " #60, refill 0  Percocet 5/325 mg 1 tab p.o. q4-6h prn breakthrough pain, disp #30, refill 0   Fergon 325 mg 1 tab p.o. bid, disp #60, refill 1  Colace 100 mg 1 tab p.o. bid prn constipation, disp #60, refill 1  Resume home medications as previously prescribed    Discharge Activites:      Activities as tolerated with adequate rest  Pelvic rest for 6 weeks   No heavy lifting greater 10 lbs or vigorous activities   Showers only  Wound care instructions and precautions given  Avoid driving and operating machinery while taking narcotics or other sedative medications.  Patient voiced understanding.    Discharge Diet:  Regular diet     Discharge Instructions:      Post-op care instructions and precautions, clinical/sugical findings, and hospital course reviewed with patient prior to discharge.  All of her questions were answered to her satisfaction.  Pt voiced understanding.  Pt was instructed to contact the clinic or emergency department for any concerns including but not limited to an increase in her vaginal bleeding, abdominal pain, foul vaginal discharge, weakness, decrease activity level, decrease appetite, difficulty with voiding or having bowel movements, wound breakdown, perineal pain or breakdown, fever >100.4, shortness of breath, chest pain, dizziness or feeling faint, pain or swelling in her extremities, headaches not relieved with rest and Tylenol, abnormal bleeding/bruising, or any other health concerns.      Follow-up Visit:  Return to clinic in 2 weeks for post-op visit or sooner if any problems.       Aamir Lopez MD

## 2018-03-13 NOTE — ANESTHESIA POSTPROCEDURE EVALUATION
"Anesthesia Post Evaluation    Patient: Nuria Franklin    Procedure(s) Performed: Procedure(s) (LRB):  HYSTERECTOMY-TOTAL LAPAROSCOPIC (TLH) (N/A)  CYSTOSCOPY (N/A)  SALPINGECTOMY-LAPAROSCOPIC (Bilateral)    Final Anesthesia Type: general  Patient location during evaluation: PACU  Patient participation: Yes- Able to Participate  Level of consciousness: awake and alert  Post-procedure vital signs: reviewed and stable  Pain management: adequate  Airway patency: patent  PONV status at discharge: No PONV  Anesthetic complications: no      Cardiovascular status: blood pressure returned to baseline and hemodynamically stable  Respiratory status: unassisted  Hydration status: euvolemic  Follow-up not needed.        Visit Vitals  BP (!) 144/74 (BP Location: Right arm, Patient Position: Lying)   Pulse 75   Temp 37.2 °C (98.9 °F) (Oral)   Resp 18   Ht 5' 6" (1.676 m)   Wt 84.4 kg (186 lb)   LMP 02/17/2018   SpO2 100%   BMI 30.02 kg/m²       Pain/Ezequiel Score: No Data Recorded      "

## 2018-03-14 ENCOUNTER — TELEPHONE (OUTPATIENT)
Dept: OBSTETRICS AND GYNECOLOGY | Facility: CLINIC | Age: 43
End: 2018-03-14

## 2018-03-14 ENCOUNTER — ANESTHESIA (OUTPATIENT)
Dept: SURGERY | Facility: HOSPITAL | Age: 43
DRG: 908 | End: 2018-03-14
Payer: COMMERCIAL

## 2018-03-14 ENCOUNTER — HOSPITAL ENCOUNTER (INPATIENT)
Facility: HOSPITAL | Age: 43
LOS: 4 days | Discharge: HOME | DRG: 908 | End: 2018-03-18
Attending: EMERGENCY MEDICINE | Admitting: OBSTETRICS & GYNECOLOGY
Payer: COMMERCIAL

## 2018-03-14 ENCOUNTER — ANESTHESIA EVENT (OUTPATIENT)
Dept: SURGERY | Facility: HOSPITAL | Age: 43
DRG: 908 | End: 2018-03-14
Payer: COMMERCIAL

## 2018-03-14 DIAGNOSIS — Z90.710 S/P LAPAROSCOPIC HYSTERECTOMY: ICD-10-CM

## 2018-03-14 DIAGNOSIS — N99.820 POSTOPERATIVE VAGINAL BLEEDING FOLLOWING GENITOURINARY PROCEDURE: Primary | ICD-10-CM

## 2018-03-14 DIAGNOSIS — D64.9 SYMPTOMATIC ANEMIA: ICD-10-CM

## 2018-03-14 DIAGNOSIS — N93.9: ICD-10-CM

## 2018-03-14 LAB
ABO + RH BLD: NORMAL
ALBUMIN SERPL BCP-MCNC: 3.9 G/DL
ALP SERPL-CCNC: 119 U/L
ALT SERPL W/O P-5'-P-CCNC: 39 U/L
ANION GAP SERPL CALC-SCNC: 10 MMOL/L
ANISOCYTOSIS BLD QL SMEAR: SLIGHT
APTT BLDCRRT: 25.8 SEC
AST SERPL-CCNC: 34 U/L
BASOPHILS # BLD AUTO: 0.06 K/UL
BASOPHILS NFR BLD: 0 %
BASOPHILS NFR BLD: 0.5 %
BILIRUB SERPL-MCNC: 0.5 MG/DL
BLD GP AB SCN CELLS X3 SERPL QL: NORMAL
BLD GP AB SCN CELLS X3 SERPL QL: NORMAL
BUN SERPL-MCNC: 11 MG/DL
CALCIUM SERPL-MCNC: 9.7 MG/DL
CHLORIDE SERPL-SCNC: 110 MMOL/L
CO2 SERPL-SCNC: 21 MMOL/L
CREAT SERPL-MCNC: 0.8 MG/DL
DACRYOCYTES BLD QL SMEAR: ABNORMAL
DIFFERENTIAL METHOD: ABNORMAL
DIFFERENTIAL METHOD: ABNORMAL
EOSINOPHIL # BLD AUTO: 0.1 K/UL
EOSINOPHIL NFR BLD: 0 %
EOSINOPHIL NFR BLD: 1.2 %
ERYTHROCYTE [DISTWIDTH] IN BLOOD BY AUTOMATED COUNT: 16.5 %
ERYTHROCYTE [DISTWIDTH] IN BLOOD BY AUTOMATED COUNT: 17.2 %
EST. GFR  (AFRICAN AMERICAN): >60 ML/MIN/1.73 M^2
EST. GFR  (NON AFRICAN AMERICAN): >60 ML/MIN/1.73 M^2
GLUCOSE SERPL-MCNC: 90 MG/DL
HCG INTACT+B SERPL-ACNC: <1.2 MIU/ML
HCT VFR BLD AUTO: 24.3 %
HCT VFR BLD AUTO: 29.7 %
HGB BLD-MCNC: 7.6 G/DL
HGB BLD-MCNC: 9.1 G/DL
HYPOCHROMIA BLD QL SMEAR: ABNORMAL
INR PPP: 1
LYMPHOCYTES # BLD AUTO: 1.7 K/UL
LYMPHOCYTES NFR BLD: 15.2 %
LYMPHOCYTES NFR BLD: 6 %
MCH RBC QN AUTO: 24.9 PG
MCH RBC QN AUTO: 26.5 PG
MCHC RBC AUTO-ENTMCNC: 30.6 G/DL
MCHC RBC AUTO-ENTMCNC: 31.3 G/DL
MCV RBC AUTO: 81 FL
MCV RBC AUTO: 85 FL
MONOCYTES # BLD AUTO: 0.8 K/UL
MONOCYTES NFR BLD: 0 %
MONOCYTES NFR BLD: 6.8 %
NEUTROPHILS # BLD AUTO: 8.6 K/UL
NEUTROPHILS NFR BLD: 76 %
NEUTROPHILS NFR BLD: 84 %
NEUTS BAND NFR BLD MANUAL: 10 %
OVALOCYTES BLD QL SMEAR: ABNORMAL
PLATELET # BLD AUTO: 207 K/UL
PLATELET # BLD AUTO: 307 K/UL
PLATELET BLD QL SMEAR: ABNORMAL
PMV BLD AUTO: 10.4 FL
PMV BLD AUTO: 11.6 FL
POIKILOCYTOSIS BLD QL SMEAR: SLIGHT
POLYCHROMASIA BLD QL SMEAR: ABNORMAL
POTASSIUM SERPL-SCNC: 3.5 MMOL/L
PROT SERPL-MCNC: 8.1 G/DL
PROTHROMBIN TIME: 10.2 SEC
RBC # BLD AUTO: 2.87 M/UL
RBC # BLD AUTO: 3.65 M/UL
SODIUM SERPL-SCNC: 141 MMOL/L
WBC # BLD AUTO: 11.35 K/UL
WBC # BLD AUTO: 23.61 K/UL

## 2018-03-14 PROCEDURE — 25000003 PHARM REV CODE 250: Performed by: NURSE ANESTHETIST, CERTIFIED REGISTERED

## 2018-03-14 PROCEDURE — 85730 THROMBOPLASTIN TIME PARTIAL: CPT

## 2018-03-14 PROCEDURE — 96360 HYDRATION IV INFUSION INIT: CPT

## 2018-03-14 PROCEDURE — 85610 PROTHROMBIN TIME: CPT

## 2018-03-14 PROCEDURE — 85025 COMPLETE CBC W/AUTO DIFF WBC: CPT

## 2018-03-14 PROCEDURE — 63600175 PHARM REV CODE 636 W HCPCS: Performed by: ANESTHESIOLOGY

## 2018-03-14 PROCEDURE — 85007 BL SMEAR W/DIFF WBC COUNT: CPT

## 2018-03-14 PROCEDURE — 86850 RBC ANTIBODY SCREEN: CPT

## 2018-03-14 PROCEDURE — P9021 RED BLOOD CELLS UNIT: HCPCS

## 2018-03-14 PROCEDURE — 27201423 OPTIME MED/SURG SUP & DEVICES STERILE SUPPLY: Performed by: OBSTETRICS & GYNECOLOGY

## 2018-03-14 PROCEDURE — 96361 HYDRATE IV INFUSION ADD-ON: CPT

## 2018-03-14 PROCEDURE — 37000009 HC ANESTHESIA EA ADD 15 MINS: Performed by: OBSTETRICS & GYNECOLOGY

## 2018-03-14 PROCEDURE — 36415 COLL VENOUS BLD VENIPUNCTURE: CPT

## 2018-03-14 PROCEDURE — 99285 EMERGENCY DEPT VISIT HI MDM: CPT | Mod: 25

## 2018-03-14 PROCEDURE — 25000003 PHARM REV CODE 250: Performed by: OBSTETRICS & GYNECOLOGY

## 2018-03-14 PROCEDURE — 71000033 HC RECOVERY, INTIAL HOUR: Performed by: OBSTETRICS & GYNECOLOGY

## 2018-03-14 PROCEDURE — 11000001 HC ACUTE MED/SURG PRIVATE ROOM

## 2018-03-14 PROCEDURE — 25000003 PHARM REV CODE 250: Performed by: EMERGENCY MEDICINE

## 2018-03-14 PROCEDURE — D9220A PRA ANESTHESIA: Mod: ANES,,, | Performed by: ANESTHESIOLOGY

## 2018-03-14 PROCEDURE — 86901 BLOOD TYPING SEROLOGIC RH(D): CPT

## 2018-03-14 PROCEDURE — 84702 CHORIONIC GONADOTROPIN TEST: CPT

## 2018-03-14 PROCEDURE — 36000706: Performed by: OBSTETRICS & GYNECOLOGY

## 2018-03-14 PROCEDURE — 85027 COMPLETE CBC AUTOMATED: CPT

## 2018-03-14 PROCEDURE — S0028 INJECTION, FAMOTIDINE, 20 MG: HCPCS | Performed by: NURSE ANESTHETIST, CERTIFIED REGISTERED

## 2018-03-14 PROCEDURE — 36000707: Performed by: OBSTETRICS & GYNECOLOGY

## 2018-03-14 PROCEDURE — 37000008 HC ANESTHESIA 1ST 15 MINUTES: Performed by: OBSTETRICS & GYNECOLOGY

## 2018-03-14 PROCEDURE — 63600175 PHARM REV CODE 636 W HCPCS: Performed by: NURSE ANESTHETIST, CERTIFIED REGISTERED

## 2018-03-14 PROCEDURE — D9220A PRA ANESTHESIA: Mod: CRNA,,, | Performed by: NURSE ANESTHETIST, CERTIFIED REGISTERED

## 2018-03-14 PROCEDURE — 0UQG0ZZ REPAIR VAGINA, OPEN APPROACH: ICD-10-PCS | Performed by: OBSTETRICS & GYNECOLOGY

## 2018-03-14 PROCEDURE — 80053 COMPREHEN METABOLIC PANEL: CPT

## 2018-03-14 PROCEDURE — 86920 COMPATIBILITY TEST SPIN: CPT

## 2018-03-14 RX ORDER — ONDANSETRON 8 MG/1
8 TABLET, ORALLY DISINTEGRATING ORAL EVERY 8 HOURS PRN
Status: DISCONTINUED | OUTPATIENT
Start: 2018-03-14 | End: 2018-03-16

## 2018-03-14 RX ORDER — SODIUM CHLORIDE 0.9 % (FLUSH) 0.9 %
3 SYRINGE (ML) INJECTION
Status: DISCONTINUED | OUTPATIENT
Start: 2018-03-14 | End: 2018-03-16

## 2018-03-14 RX ORDER — IBUPROFEN 600 MG/1
600 TABLET ORAL EVERY 6 HOURS PRN
Status: DISCONTINUED | OUTPATIENT
Start: 2018-03-14 | End: 2018-03-18 | Stop reason: HOSPADM

## 2018-03-14 RX ORDER — ONDANSETRON 2 MG/ML
4 INJECTION INTRAMUSCULAR; INTRAVENOUS EVERY 6 HOURS PRN
Status: DISCONTINUED | OUTPATIENT
Start: 2018-03-14 | End: 2018-03-14 | Stop reason: HOSPADM

## 2018-03-14 RX ORDER — OXYCODONE AND ACETAMINOPHEN 10; 325 MG/1; MG/1
1 TABLET ORAL EVERY 4 HOURS PRN
Status: DISCONTINUED | OUTPATIENT
Start: 2018-03-14 | End: 2018-03-16

## 2018-03-14 RX ORDER — MEPERIDINE HYDROCHLORIDE 10 MG/ML
50 INJECTION, SOLUTION INTRAVENOUS EVERY 4 HOURS PRN
Status: DISCONTINUED | OUTPATIENT
Start: 2018-03-14 | End: 2018-03-14 | Stop reason: CLARIF

## 2018-03-14 RX ORDER — GLYCOPYRROLATE 0.2 MG/ML
INJECTION INTRAMUSCULAR; INTRAVENOUS
Status: DISCONTINUED | OUTPATIENT
Start: 2018-03-14 | End: 2018-03-14

## 2018-03-14 RX ORDER — FENTANYL CITRATE 50 UG/ML
INJECTION, SOLUTION INTRAMUSCULAR; INTRAVENOUS
Status: DISCONTINUED | OUTPATIENT
Start: 2018-03-14 | End: 2018-03-14

## 2018-03-14 RX ORDER — SODIUM CHLORIDE 9 MG/ML
125 INJECTION, SOLUTION INTRAVENOUS CONTINUOUS
Status: DISCONTINUED | OUTPATIENT
Start: 2018-03-14 | End: 2018-03-16

## 2018-03-14 RX ORDER — PHENYLEPHRINE HYDROCHLORIDE 10 MG/ML
INJECTION INTRAVENOUS
Status: DISCONTINUED | OUTPATIENT
Start: 2018-03-14 | End: 2018-03-14

## 2018-03-14 RX ORDER — ONDANSETRON 2 MG/ML
INJECTION INTRAMUSCULAR; INTRAVENOUS
Status: DISCONTINUED | OUTPATIENT
Start: 2018-03-14 | End: 2018-03-14

## 2018-03-14 RX ORDER — HYDROMORPHONE HYDROCHLORIDE 1 MG/ML
0.2 INJECTION, SOLUTION INTRAMUSCULAR; INTRAVENOUS; SUBCUTANEOUS EVERY 5 MIN PRN
Status: DISCONTINUED | OUTPATIENT
Start: 2018-03-14 | End: 2018-03-14 | Stop reason: HOSPADM

## 2018-03-14 RX ORDER — LIDOCAINE HCL/PF 100 MG/5ML
SYRINGE (ML) INTRAVENOUS
Status: DISCONTINUED | OUTPATIENT
Start: 2018-03-14 | End: 2018-03-14

## 2018-03-14 RX ORDER — SODIUM CHLORIDE 0.9 % (FLUSH) 0.9 %
3 SYRINGE (ML) INJECTION
Status: DISCONTINUED | OUTPATIENT
Start: 2018-03-14 | End: 2018-03-14 | Stop reason: HOSPADM

## 2018-03-14 RX ORDER — SODIUM CHLORIDE, SODIUM LACTATE, POTASSIUM CHLORIDE, CALCIUM CHLORIDE 600; 310; 30; 20 MG/100ML; MG/100ML; MG/100ML; MG/100ML
INJECTION, SOLUTION INTRAVENOUS CONTINUOUS PRN
Status: DISCONTINUED | OUTPATIENT
Start: 2018-03-14 | End: 2018-03-14

## 2018-03-14 RX ORDER — MEPERIDINE HYDROCHLORIDE 50 MG/ML
50 INJECTION INTRAMUSCULAR; INTRAVENOUS; SUBCUTANEOUS EVERY 4 HOURS PRN
Status: DISCONTINUED | OUTPATIENT
Start: 2018-03-14 | End: 2018-03-14

## 2018-03-14 RX ORDER — NEOSTIGMINE METHYLSULFATE 1 MG/ML
INJECTION, SOLUTION INTRAVENOUS
Status: DISCONTINUED | OUTPATIENT
Start: 2018-03-14 | End: 2018-03-14

## 2018-03-14 RX ORDER — MIDAZOLAM HYDROCHLORIDE 1 MG/ML
INJECTION, SOLUTION INTRAMUSCULAR; INTRAVENOUS
Status: DISCONTINUED | OUTPATIENT
Start: 2018-03-14 | End: 2018-03-14

## 2018-03-14 RX ORDER — ACETAMINOPHEN 10 MG/ML
INJECTION, SOLUTION INTRAVENOUS
Status: DISCONTINUED | OUTPATIENT
Start: 2018-03-14 | End: 2018-03-14

## 2018-03-14 RX ORDER — CEFOXITIN SODIUM 2 G/50ML
2 INJECTION, SOLUTION INTRAVENOUS
Status: DISCONTINUED | OUTPATIENT
Start: 2018-03-14 | End: 2018-03-14 | Stop reason: DRUGHIGH

## 2018-03-14 RX ORDER — MEPERIDINE HYDROCHLORIDE 50 MG/ML
100 INJECTION INTRAMUSCULAR; INTRAVENOUS; SUBCUTANEOUS EVERY 4 HOURS PRN
Status: DISCONTINUED | OUTPATIENT
Start: 2018-03-14 | End: 2018-03-14

## 2018-03-14 RX ORDER — CEFOXITIN SODIUM 2 G/50ML
2 INJECTION, SOLUTION INTRAVENOUS
Status: COMPLETED | OUTPATIENT
Start: 2018-03-15 | End: 2018-03-15

## 2018-03-14 RX ORDER — ONDANSETRON 2 MG/ML
4 INJECTION INTRAMUSCULAR; INTRAVENOUS ONCE AS NEEDED
Status: DISCONTINUED | OUTPATIENT
Start: 2018-03-14 | End: 2018-03-14

## 2018-03-14 RX ORDER — PROPOFOL 10 MG/ML
VIAL (ML) INTRAVENOUS
Status: DISCONTINUED | OUTPATIENT
Start: 2018-03-14 | End: 2018-03-14

## 2018-03-14 RX ORDER — ROCURONIUM BROMIDE 10 MG/ML
INJECTION, SOLUTION INTRAVENOUS
Status: DISCONTINUED | OUTPATIENT
Start: 2018-03-14 | End: 2018-03-14

## 2018-03-14 RX ORDER — FAMOTIDINE 10 MG/ML
INJECTION INTRAVENOUS
Status: DISCONTINUED | OUTPATIENT
Start: 2018-03-14 | End: 2018-03-14

## 2018-03-14 RX ORDER — OXYCODONE AND ACETAMINOPHEN 5; 325 MG/1; MG/1
1 TABLET ORAL EVERY 4 HOURS PRN
Status: DISCONTINUED | OUTPATIENT
Start: 2018-03-14 | End: 2018-03-16

## 2018-03-14 RX ORDER — SODIUM CHLORIDE 9 MG/ML
INJECTION, SOLUTION INTRAVENOUS CONTINUOUS
Status: DISCONTINUED | OUTPATIENT
Start: 2018-03-14 | End: 2018-03-16

## 2018-03-14 RX ORDER — SUCCINYLCHOLINE CHLORIDE 20 MG/ML
INJECTION INTRAMUSCULAR; INTRAVENOUS
Status: DISCONTINUED | OUTPATIENT
Start: 2018-03-14 | End: 2018-03-14

## 2018-03-14 RX ORDER — ONDANSETRON 2 MG/ML
4 INJECTION INTRAMUSCULAR; INTRAVENOUS EVERY 6 HOURS PRN
Status: DISCONTINUED | OUTPATIENT
Start: 2018-03-14 | End: 2018-03-14

## 2018-03-14 RX ADMIN — PHENYLEPHRINE HYDROCHLORIDE 200 MCG: 10 INJECTION INTRAVENOUS at 06:03

## 2018-03-14 RX ADMIN — PROPOFOL 50 MG: 10 INJECTION, EMULSION INTRAVENOUS at 06:03

## 2018-03-14 RX ADMIN — PROPOFOL 40 MG: 10 INJECTION, EMULSION INTRAVENOUS at 08:03

## 2018-03-14 RX ADMIN — ROCURONIUM BROMIDE 5 MG: 10 INJECTION, SOLUTION INTRAVENOUS at 06:03

## 2018-03-14 RX ADMIN — ONDANSETRON 4 MG: 2 INJECTION, SOLUTION INTRAMUSCULAR; INTRAVENOUS at 09:03

## 2018-03-14 RX ADMIN — GLYCOPYRROLATE 0.6 MG: 0.2 INJECTION, SOLUTION INTRAMUSCULAR; INTRAVENOUS at 08:03

## 2018-03-14 RX ADMIN — Medication 0.2 MG: at 08:03

## 2018-03-14 RX ADMIN — MIDAZOLAM HYDROCHLORIDE 1 MG: 1 INJECTION, SOLUTION INTRAMUSCULAR; INTRAVENOUS at 06:03

## 2018-03-14 RX ADMIN — ONDANSETRON 4 MG: 2 INJECTION, SOLUTION INTRAMUSCULAR; INTRAVENOUS at 06:03

## 2018-03-14 RX ADMIN — NEOSTIGMINE METHYLSULFATE 5 MG: 1 INJECTION INTRAVENOUS at 08:03

## 2018-03-14 RX ADMIN — SODIUM CHLORIDE, SODIUM LACTATE, POTASSIUM CHLORIDE, AND CALCIUM CHLORIDE: .6; .31; .03; .02 INJECTION, SOLUTION INTRAVENOUS at 07:03

## 2018-03-14 RX ADMIN — PROPOFOL 130 MG: 10 INJECTION, EMULSION INTRAVENOUS at 06:03

## 2018-03-14 RX ADMIN — FENTANYL CITRATE 50 MCG: 50 INJECTION INTRAMUSCULAR; INTRAVENOUS at 08:03

## 2018-03-14 RX ADMIN — PHENYLEPHRINE HYDROCHLORIDE 100 MCG: 10 INJECTION INTRAVENOUS at 06:03

## 2018-03-14 RX ADMIN — CEFAZOLIN SODIUM 2 G: 1 POWDER, FOR SOLUTION INTRAMUSCULAR; INTRAVENOUS at 06:03

## 2018-03-14 RX ADMIN — ACETAMINOPHEN 1000 MG: 10 INJECTION, SOLUTION INTRAVENOUS at 07:03

## 2018-03-14 RX ADMIN — LIDOCAINE HYDROCHLORIDE 100 MG: 20 INJECTION, SOLUTION INTRAVENOUS at 06:03

## 2018-03-14 RX ADMIN — OXYCODONE HYDROCHLORIDE AND ACETAMINOPHEN 1 TABLET: 10; 325 TABLET ORAL at 10:03

## 2018-03-14 RX ADMIN — SODIUM CHLORIDE: 0.9 INJECTION, SOLUTION INTRAVENOUS at 07:03

## 2018-03-14 RX ADMIN — PROPOFOL 50 MG: 10 INJECTION, EMULSION INTRAVENOUS at 08:03

## 2018-03-14 RX ADMIN — MIDAZOLAM HYDROCHLORIDE 1 MG: 1 INJECTION, SOLUTION INTRAMUSCULAR; INTRAVENOUS at 08:03

## 2018-03-14 RX ADMIN — FAMOTIDINE 20 MG: 10 INJECTION, SOLUTION INTRAVENOUS at 06:03

## 2018-03-14 RX ADMIN — SODIUM CHLORIDE 125 ML/HR: 0.9 INJECTION, SOLUTION INTRAVENOUS at 04:03

## 2018-03-14 RX ADMIN — SODIUM CHLORIDE 1000 ML: 0.9 INJECTION, SOLUTION INTRAVENOUS at 03:03

## 2018-03-14 RX ADMIN — SUCCINYLCHOLINE CHLORIDE 120 MG: 20 INJECTION, SOLUTION INTRAMUSCULAR; INTRAVENOUS at 06:03

## 2018-03-14 RX ADMIN — FENTANYL CITRATE 50 MCG: 50 INJECTION INTRAMUSCULAR; INTRAVENOUS at 06:03

## 2018-03-14 RX ADMIN — SODIUM CHLORIDE: 0.9 INJECTION, SOLUTION INTRAVENOUS at 10:03

## 2018-03-14 NOTE — ED PROVIDER NOTES
Encounter Date: 3/14/2018          History     Chief Complaint   Patient presents with    Vaginal Bleeding     started a couple minutes ago     42 year old black female with heavy vaginal bleeding for 24 hours  With clots patient is S/P laparoscopic Hysterectomy bilateral salpingoophorectomy 3/2/2018 complicated by bleeding from the vaginal cuff. No pain          Review of patient's allergies indicates:  No Known Allergies  Past Medical History:   Diagnosis Date    Anemia     Miscarriage     2013    Preeclampsia 10/6/2014     Past Surgical History:   Procedure Laterality Date    DILATION AND CURETTAGE OF UTERUS      HYSTERECTOMY       History reviewed. No pertinent family history.  Social History   Substance Use Topics    Smoking status: Never Smoker    Smokeless tobacco: Never Used    Alcohol use No     Review of Systems   Unable to perform ROS: Acuity of condition       Physical Exam     Initial Vitals [03/14/18 1354]   BP Pulse Resp Temp SpO2   (!) 189/108 (!) 133 20 98.1 °F (36.7 °C) 100 %      MAP       135         Physical Exam    Nursing note and vitals reviewed.  Constitutional: She appears well-developed and well-nourished.   HENT:   Right Ear: External ear normal.   Left Ear: External ear normal.   Nose: Nose normal.   Mouth/Throat: Oropharynx is clear and moist.   Eyes: Conjunctivae and EOM are normal. Pupils are equal, round, and reactive to light.   Neck: Normal range of motion. Neck supple.   Cardiovascular: Normal rate, regular rhythm and normal heart sounds.   Pulmonary/Chest: Breath sounds normal.   Abdominal: Soft. Bowel sounds are normal. She exhibits mass.   Gravid uterus in epigasrium   Genitourinary:   Genitourinary Comments: Brisk bleeding from the vaginal cuff with lots of clots noted. No masses on vaginal exam   Musculoskeletal: Normal range of motion.   Neurological: She is alert and oriented to person, place, and time. She has normal strength.   Skin: Skin is warm and dry.    Psychiatric: She has a normal mood and affect. Her behavior is normal. Judgment and thought content normal.         ED Course   Procedures  Labs Reviewed   CBC W/ AUTO DIFFERENTIAL - Abnormal; Notable for the following:        Result Value    RBC 3.65 (*)     Hemoglobin 9.1 (*)     Hematocrit 29.7 (*)     MCV 81 (*)     MCH 24.9 (*)     MCHC 30.6 (*)     RDW 16.5 (*)     Gran # (ANC) 8.6 (*)     Gran% 76.0 (*)     Lymph% 15.2 (*)     All other components within normal limits   COMPREHENSIVE METABOLIC PANEL - Abnormal; Notable for the following:     CO2 21 (*)     All other components within normal limits   HCG, QUANTITATIVE, PREGNANCY   PROTIME-INR   APTT   GROUP & RH   TYPE & SCREEN   INDIRECT ANTIGLOBULIN TEST             Medical Decision Making:   Clinical Tests:   Lab Tests: Reviewed  The following lab test(s) were unremarkable: CBC and CMP       <> Summary of Lab: Results for EDDIE LANDAVERDE (MRN [id number]) as of 3/14/2018 16:32    3/14/2018 15:11  WBC: 11.35  RBC: 3.65 (L)  Hemoglobin: 9.1 (L)  Hematocrit: 29.7 (L)  MCV: 81 (L)  MCH: 24.9 (L)  MCHC: 30.6 (L)  RDW: 16.5 (H)  Platelets: 307  MPV: 11.6  Gran%: 76.0 (H)  Gran # (ANC): 8.6 (H)  Lymph%: 15.2 (L)  Lymph #: 1.7  Mono%: 6.8  Mono #: 0.8  Eosinophil%: 1.2  Eos #: 0.1  Basophil%: 0.5  Baso #: 0.06  Sodium: 141  Potassium: 3.5  Chloride: 110  CO2: 21 (L)  Anion Gap: 10  BUN, Bld: 11  Creatinine: 0.8  eGFR if non : >60  eGFR if African American: >60  Glucose: 90  Calcium: 9.7  Alkaline Phosphatase: 119  Total Protein: 8.1  Albumin: 3.9  Total Bilirubin: 0.5  AST: 34  ALT: 39  hCG Quant: <1.2      Other:   I have discussed this case with another health care provider.       <> Summary of the Discussion: 1631h Dr. Sam said he will head right over to see the patient.  1708h Dr. Antonio Caal will instead come in to see the patient                      Clinical Impression:   The primary encounter diagnosis was Postoperative vaginal  bleeding following genitourinary procedure. A diagnosis of Profuse vaginal bleeding was also pertinent to this visit.    Disposition:   Disposition: Admitted  Condition: Serious                        Jigar Wilkes MD  03/15/18 0838

## 2018-03-14 NOTE — HPI
41 y/o who is status post laparoscopic TLH and bilateral salpingectomy 03/02/2018 was at home when she started passing large clots . She presented to ED where she was noted to be having brisk bleeding from her vagina. She was also tachycardic and H/H 9.1/29.7 with about 600 cc of clots in bedpan. Patient reports that she had bleeding several hours after surgery and had to be taken back to the operating room where she had another laparoscopy and also repair of her vaginal cuff. We have informed the patient and her daughter that we will attempt to stop her bleeding vaginally but if there is any doubt then we will do exploratory laparotomy and stop the bleeding in that way. She has been advised specifically that there is increased risk for injury to her ureters.    Recent Labs  Lab 03/14/18  1511   WBC 11.35   RBC 3.65*   HGB 9.1*   HCT 29.7*      MCV 81*   MCH 24.9*   MCHC 30.6*

## 2018-03-14 NOTE — TELEPHONE ENCOUNTER
----- Message from Basia Pugh sent at 3/14/2018  1:52 PM CDT -----  Contact: self  Patient requests to speak to staff regarding heavy bleeding from surgery. She states she is on her way to the ER. Pt can be reached at [phone]. Thank you!  -----------------------------------------------------  3/14/18 @ 0594 ([facility])  SPOKE WITH MS TIRADO'S DAUGHTER MESHA, SHE STATED SHE TRANSLATES FOR HER MOTHER, HER MOTHER WENT TO E.R. DUE TO HEAVY BLEEDING POST HYSTERECTOMY AS DIRECTED PER DR RAMOS A COUPLE OF WEEKS AGO,   WILL FORWARD MESSAGE TO  DR RAMOS & RICH FOR BLADE

## 2018-03-14 NOTE — HOSPITAL COURSE
Patient here with post operative vaginal bleeding. She is tachycardic and has passed at least 600 cc of blood and clots tn bucket. She has been consented for exploratory laparotomy but we will attempt a vaginal approach initially.

## 2018-03-14 NOTE — ED TRIAGE NOTES
Pt arrived with c/o of vaginal bleeding that started at 1:22pm. Had a hysterectomy 3/2/2018. Had previously had having bleeding hours after sx and had to go back to OR same day. Pt's bleeding continued during assessment. Denies dizziness, SOB, or any fall recently.     @3645 - Notified Let[facility] in [place] that patient was still waiting to be taken. Stated she will send for pt as soon as they could.

## 2018-03-14 NOTE — SUBJECTIVE & OBJECTIVE
Obstetric History       T6      L6     SAB2   TAB0   Ectopic0   Multiple0   Live Births6       # Outcome Date GA Lbr Dre/2nd Weight Sex Delivery Anes PTL Lv   8 Term 10/07/14 39w3d  3.722 kg (8 lb 3.3 oz) M Vag-Spont None N PARKER      Apgar1:  8                Apgar5: 9   7 2013           6 2012           5 Term    2.722 kg (6 lb) M Vag-Spont   PARKER   4 Term    4.037 kg (8 lb 14.4 oz) M Vag-Spont   PARKER   3 Term    3.175 kg (7 lb) M Vag-Spont   PARKER   2 Term    2.722 kg (6 lb) F Vag-Spont   PARKER   1 Term    2.268 kg (5 lb) F Vag-Spont   PARKER        Past Medical History:   Diagnosis Date    Anemia     Miscarriage         Preeclampsia 10/6/2014     Past Surgical History:   Procedure Laterality Date    DILATION AND CURETTAGE OF UTERUS      HYSTERECTOMY         PTA Medications   Medication Sig    docusate sodium (COLACE) 100 MG capsule Take 1 capsule (100 mg total) by mouth 2 (two) times daily as needed for Constipation.    ferrous sulfate 325 mg (65 mg iron) Tab tablet Take 1 tablet (325 mg total) by mouth 2 (two) times daily.    ibuprofen (ADVIL,MOTRIN) 600 MG tablet Take 1 tablet (600 mg total) by mouth every 6 (six) hours as needed for Pain.    oxyCODONE-acetaminophen (PERCOCET) 5-325 mg per tablet Take 1 tablet by mouth every 4 (four) hours as needed for Pain.       Review of patient's allergies indicates:  No Known Allergies     Family History     None        Social History Main Topics    Smoking status: Never Smoker    Smokeless tobacco: Never Used    Alcohol use No    Drug use: No    Sexual activity: Yes     Partners: Female     Birth control/ protection: None     Review of Systems   Constitutional: Positive for activity change, appetite change, diaphoresis and fatigue.   HENT: Negative.    Eyes: Negative.    Respiratory: Negative.    Cardiovascular: Negative.    Gastrointestinal: Negative.    Endocrine: Negative.    Genitourinary: Positive for vaginal  "bleeding.   Musculoskeletal: Negative.    Skin:  Negative.   Neurological: Negative.    Hematological: Negative.    Psychiatric/Behavioral: Negative.    Breast: negative.    All other systems reviewed and are negative.     Objective:     Vital Signs (Most Recent):  Temp: 98.9 °F (37.2 °C) (03/03/18 1202)  Pulse: 75 (03/03/18 1202)  Resp: 18 (03/03/18 1202)  BP: (!) 144/74 (03/03/18 1202)  SpO2: 100 % (03/02/18 2045) Vital Signs (24h Range):        Weight: 84.4 kg (186 lb)  Body mass index is 30.02 kg/m².    Patient's last menstrual period was 02/17/2018.    Physical Exam:   Constitutional: She is oriented to person, place, and time. She appears well-developed and well-nourished.    HENT:   Head: Normocephalic and atraumatic.   Nose: Nose normal.    Eyes: Conjunctivae and EOM are normal. Pupils are equal, round, and reactive to light.    Neck: Normal range of motion. Neck supple.    Cardiovascular: Normal rate, regular rhythm, normal heart sounds and intact distal pulses.     Pulmonary/Chest: Effort normal and breath sounds normal.        Abdominal: Soft. Bowel sounds are normal.             Musculoskeletal: Normal range of motion and moves all extremeties.       Neurological: She is alert and oriented to person, place, and time. She has normal reflexes.    Skin: Skin is warm and dry.    Psychiatric: She has a normal mood and affect. Her behavior is normal. Judgment and thought content normal.       Laboratory:  {Results:81203}    Diagnostic Results:  {Results; Diagnostics:[id number]::"***"}  "

## 2018-03-15 LAB
ALBUMIN SERPL BCP-MCNC: 2.5 G/DL
ALP SERPL-CCNC: 70 U/L
ALT SERPL W/O P-5'-P-CCNC: 25 U/L
ANION GAP SERPL CALC-SCNC: 8 MMOL/L
AST SERPL-CCNC: 21 U/L
BASOPHILS # BLD AUTO: 0.02 K/UL
BASOPHILS # BLD AUTO: 0.04 K/UL
BASOPHILS NFR BLD: 0.1 %
BASOPHILS NFR BLD: 0.3 %
BILIRUB SERPL-MCNC: 0.9 MG/DL
BLD PROD TYP BPU: NORMAL
BLD PROD TYP BPU: NORMAL
BLOOD UNIT EXPIRATION DATE: NORMAL
BLOOD UNIT EXPIRATION DATE: NORMAL
BLOOD UNIT TYPE CODE: 5100
BLOOD UNIT TYPE CODE: 5100
BLOOD UNIT TYPE: NORMAL
BLOOD UNIT TYPE: NORMAL
BUN SERPL-MCNC: 11 MG/DL
CALCIUM SERPL-MCNC: 7.9 MG/DL
CHLORIDE SERPL-SCNC: 112 MMOL/L
CO2 SERPL-SCNC: 19 MMOL/L
CODING SYSTEM: NORMAL
CODING SYSTEM: NORMAL
CREAT SERPL-MCNC: 0.8 MG/DL
DIFFERENTIAL METHOD: ABNORMAL
DIFFERENTIAL METHOD: ABNORMAL
DISPENSE STATUS: NORMAL
DISPENSE STATUS: NORMAL
EOSINOPHIL # BLD AUTO: 0 K/UL
EOSINOPHIL # BLD AUTO: 0.1 K/UL
EOSINOPHIL NFR BLD: 0.1 %
EOSINOPHIL NFR BLD: 0.5 %
ERYTHROCYTE [DISTWIDTH] IN BLOOD BY AUTOMATED COUNT: 16.2 %
ERYTHROCYTE [DISTWIDTH] IN BLOOD BY AUTOMATED COUNT: 16.8 %
EST. GFR  (AFRICAN AMERICAN): >60 ML/MIN/1.73 M^2
EST. GFR  (NON AFRICAN AMERICAN): >60 ML/MIN/1.73 M^2
GLUCOSE SERPL-MCNC: 121 MG/DL
HCT VFR BLD AUTO: 20.2 %
HCT VFR BLD AUTO: 26 %
HGB BLD-MCNC: 6.3 G/DL
HGB BLD-MCNC: 8.1 G/DL
LYMPHOCYTES # BLD AUTO: 1.1 K/UL
LYMPHOCYTES # BLD AUTO: 1.2 K/UL
LYMPHOCYTES NFR BLD: 8 %
LYMPHOCYTES NFR BLD: 8.5 %
MCH RBC QN AUTO: 26.6 PG
MCH RBC QN AUTO: 26.6 PG
MCHC RBC AUTO-ENTMCNC: 31.2 G/DL
MCHC RBC AUTO-ENTMCNC: 31.2 G/DL
MCV RBC AUTO: 85 FL
MCV RBC AUTO: 86 FL
MONOCYTES # BLD AUTO: 0.8 K/UL
MONOCYTES # BLD AUTO: 0.9 K/UL
MONOCYTES NFR BLD: 6 %
MONOCYTES NFR BLD: 6.1 %
NEUTROPHILS # BLD AUTO: 11.2 K/UL
NEUTROPHILS # BLD AUTO: 13.2 K/UL
NEUTROPHILS NFR BLD: 84.3 %
NEUTROPHILS NFR BLD: 85.8 %
PLATELET # BLD AUTO: 197 K/UL
PLATELET # BLD AUTO: 216 K/UL
PMV BLD AUTO: 10.7 FL
PMV BLD AUTO: 10.9 FL
POTASSIUM SERPL-SCNC: 4.5 MMOL/L
PROT SERPL-MCNC: 4.9 G/DL
RBC # BLD AUTO: 2.37 M/UL
RBC # BLD AUTO: 3.04 M/UL
SODIUM SERPL-SCNC: 139 MMOL/L
TRANS ERYTHROCYTES VOL PATIENT: NORMAL ML
TRANS ERYTHROCYTES VOL PATIENT: NORMAL ML
WBC # BLD AUTO: 13.23 K/UL
WBC # BLD AUTO: 15.42 K/UL

## 2018-03-15 PROCEDURE — 36430 TRANSFUSION BLD/BLD COMPNT: CPT

## 2018-03-15 PROCEDURE — P9021 RED BLOOD CELLS UNIT: HCPCS

## 2018-03-15 PROCEDURE — 85025 COMPLETE CBC W/AUTO DIFF WBC: CPT | Mod: 91

## 2018-03-15 PROCEDURE — 11000001 HC ACUTE MED/SURG PRIVATE ROOM

## 2018-03-15 PROCEDURE — 80053 COMPREHEN METABOLIC PANEL: CPT

## 2018-03-15 PROCEDURE — 25000003 PHARM REV CODE 250: Performed by: OBSTETRICS & GYNECOLOGY

## 2018-03-15 PROCEDURE — 25500020 PHARM REV CODE 255: Performed by: OBSTETRICS & GYNECOLOGY

## 2018-03-15 PROCEDURE — 36415 COLL VENOUS BLD VENIPUNCTURE: CPT

## 2018-03-15 PROCEDURE — 63600175 PHARM REV CODE 636 W HCPCS: Performed by: OBSTETRICS & GYNECOLOGY

## 2018-03-15 RX ORDER — HYDROCODONE BITARTRATE AND ACETAMINOPHEN 500; 5 MG/1; MG/1
TABLET ORAL
Status: DISCONTINUED | OUTPATIENT
Start: 2018-03-15 | End: 2018-03-16

## 2018-03-15 RX ADMIN — IOHEXOL 80 ML: 350 INJECTION, SOLUTION INTRAVENOUS at 07:03

## 2018-03-15 RX ADMIN — IOHEXOL 15 ML: 300 INJECTION, SOLUTION INTRAVENOUS at 07:03

## 2018-03-15 RX ADMIN — IBUPROFEN 600 MG: 600 TABLET, FILM COATED ORAL at 11:03

## 2018-03-15 RX ADMIN — CEFOXITIN SODIUM 2 G: 2 INJECTION, SOLUTION INTRAVENOUS at 12:03

## 2018-03-15 RX ADMIN — IBUPROFEN 600 MG: 600 TABLET, FILM COATED ORAL at 05:03

## 2018-03-15 RX ADMIN — CEFOXITIN SODIUM 2 G: 2 INJECTION, SOLUTION INTRAVENOUS at 11:03

## 2018-03-15 RX ADMIN — CEFOXITIN SODIUM 2 G: 2 INJECTION, SOLUTION INTRAVENOUS at 06:03

## 2018-03-15 RX ADMIN — OXYCODONE HYDROCHLORIDE AND ACETAMINOPHEN 1 TABLET: 10; 325 TABLET ORAL at 10:03

## 2018-03-15 NOTE — PLAN OF CARE
Problem: Patient Care Overview  Goal: Individualization & Mutuality  Outcome: Ongoing (interventions implemented as appropriate)  Pt. poc reviewed with her, she voiced understanding. I&o, removed cason at 1115, awaiting first void post catheter removal. ivf infusing well. S.o. At pt. Bedside. Pt. Pain controlled with percocet and motrin prn. Enc. Pt. To move around in bed, but call when ready to get up oob for assistance first time. Status post blood transfusion X1 unit today, more on hold if MD prefers upon rounding. Vaginal bleeding-monitoring, present. Diet advancing as tolerated, but hypoactive bowel sounds with no flatus being passed, will advance as pt. Advances. Vss.

## 2018-03-15 NOTE — PLAN OF CARE
"Problem: Surgery Nonspecified (Adult)  Intervention: Support Surgical/Anesthesia Recovery   03/14/18 2044   Safety Interventions   Safety Precautions emergency equipment at bedside     Intervention: Monitor/Manage Pain   03/14/18 2044   Safety Interventions   Medication Review/Management medications reviewed;high risk medications identified   Pain/Comfort/Sleep Interventions   Pain Management Interventions relaxation promoted;quiet environment facilitated;pain management plan reviewed with patient/caregiver       Goal: Signs and Symptoms of Listed Potential Problems Will be Absent, Minimized or Managed (Surgery Nonspecified)  Signs and symptoms of listed potential problems will be absent, minimized or managed by discharge/transition of care (reference Surgery Nonspecified (Adult) CPG).  Outcome: Ongoing (interventions implemented as appropriate)   03/14/18 2044   Surgery Nonspecified   Problems Assessed (Surgery) all   Problems Present (Surgery) pain   C/o pain at "bottom" 5/10"  Goal: Anesthesia/Sedation Recovery   03/14/18 2044   Goal/Outcome Evaluation   Anesthesia/Sedation Recovery progressing toward baseline         "

## 2018-03-15 NOTE — BRIEF OP NOTE
[facility]  Brief Operative Note    SUMMARY     Surgery Date: 3/14/2018     Surgeon(s) and Role:     * Antonio Lew MD - Primary    Assisting Surgeon: None    Pre-op Diagnosis:  Vaginal bleeding [N93.9]    Post-op Diagnosis:  Post-Op Diagnosis Codes:     * Vaginal bleeding [N93.9]    Procedure(s) (LRB):  EXPLORATORY-LAPAROTOMY (N/A)  REPAIR-VAGINAL CUFF    Anesthesia: General    Description of Procedure: attempted repair of vaginal cuff through vagina but bleeding from inside abdomen.;  Exploratory laparotomy done    Description of the findings of the procedure: Vaginal cuff open in the center. Bleeding is from above the cuff on the right side with pumper from cervical branch of uterine . Suture repaired         Estimated Blood Loss: 150 mL         Specimens:   Specimen (12h ago through future)    None

## 2018-03-15 NOTE — ANESTHESIA PREPROCEDURE EVALUATION
03/14/2018  Nuria Franklin is a 42 y.o., female who had a TLH  and subsequent bring back for bleeding two weeks ago. Plan to return to OR for EUA to identify suspected vaginal cuff bleed and also to investigate for any abdominal or pelvic sources of bleeding. Patient is stable, but tachycardic and hypotensive from baseline.   Obtained anesthesia and blood consent.    Pre-op Assessment     I have reviewed the Nursing Notes.      Review of Systems  Anesthesia Hx:  No problems with previous Anesthesia  History of prior surgery of interest to airway management or planning:  Denies Personal Hx of Anesthesia complications.   Social:  Non-Smoker    Hematology/Oncology:         -- Anemia:   Cardiovascular:   Exercise tolerance: good Denies Pacemaker. Hypertension  Denies Valvular problems/Murmurs.  Denies MI.  Denies CAD.    Denies CABG/stent.  Denies Dysrhythmias.   Denies Angina.             denies PVD no hyperlipidemia        Pulmonary:  Pulmonary Normal    Renal/:  Renal/ Normal     Hepatic/GI:  Hepatic/GI Normal    OB/GYN/PEDS:  Vaginal bleeding following recent hysterectomy   Neurological:  Neurology Normal    Endocrine:  Endocrine Normal        Physical Exam  General:  Obesity    Airway/Jaw/Neck:  Airway Findings: Mouth Opening: Normal Tongue: Normal  Mallampati: II  TM Distance: Normal, at least 6 cm      Dental:  Dental Findings: Prominent Incisors   Chest/Lungs:  Chest/Lungs Findings: Clear to auscultation     Heart/Vascular:  Heart Findings: Rate: Tachycardia  Rhythm: Regular Rhythm        Mental Status:  Mental Status Findings: Normal        Anesthesia Plan  Type of Anesthesia, risks & benefits discussed:  Anesthesia Type:  general  Patient's Preference:   Intra-op Monitoring Plan: standard ASA monitors  Intra-op Monitoring Plan Comments:   Post Op Pain Control Plan: multimodal analgesia, IV/PO  Opioids PRN and per primary service following discharge from PACU  Post Op Pain Control Plan Comments:   Induction:   IV  Beta Blocker:  Patient is not currently on a Beta-Blocker (No further documentation required).       Informed Consent: Patient understands risks and agrees with Anesthesia plan.  Questions answered. Anesthesia consent signed with patient.  ASA Score: 2  emergent   Day of Surgery Review of History & Physical:    H&P update referred to the surgeon.     Anesthesia Plan Notes: NPO Adequate        Ready For Surgery From Anesthesia Perspective.

## 2018-03-15 NOTE — PROGRESS NOTES
Dr. Lew at pt. Bedside, new orders noted for CT with and without contrast to rule out any bleeding. Family at pt. Bedside.

## 2018-03-15 NOTE — PROGRESS NOTES
PRBC 1 completed. VSS. S.o. At pt. Bedside. Pt. Enc. To have her clear liquid diet and move around in bed more, she voices understanding. No c/o concerns from pt. At this time.

## 2018-03-15 NOTE — PLAN OF CARE
Problem: Patient Care Overview  Goal: Plan of Care Review  Pt alert, c/o generalized abdominal pain, abdomen is soft and tender upon touch. No distention noted. aquacel dressing is intact, with dried scant drainage noted to center of dressing which is marked. No vaginal bleeding noted. VSS. Able to tolerate PO fluids and pills, currently liquid diet. Pending CBC/cmp in am. Currently on room air with no respiratory abnormalities noted. On bed rest, able to turn in bed with encouragement. SCDs in place. NS infusing. Daughter at bedside.     0420: tech report hypotensive with automatic. Performed manual with 80/50s lying and sitting with positioning. Pt is currently asymptomatic. Alert and oriented. Made labs stat, received 1 unit of PRBCs in OR.     0540: notified MD of drop in H/H. Ordered 1 Unit of PRBCs with 2 pending.

## 2018-03-15 NOTE — ANESTHESIA POSTPROCEDURE EVALUATION
"Anesthesia Post Evaluation    Patient: Nuria Franklin    Procedure(s) Performed: Procedure(s) (LRB):  EXPLORATORY-LAPAROTOMY (N/A)  REPAIR-VAGINAL CUFF    Final Anesthesia Type: general  Patient location during evaluation: PACU  Patient participation: Yes- Able to Participate  Level of consciousness: awake and alert and oriented  Post-procedure vital signs: reviewed and stable  Pain management: adequate  Airway patency: patent  PONV status at discharge: No PONV  Anesthetic complications: no      Cardiovascular status: blood pressure returned to baseline and hemodynamically stable  Respiratory status: unassisted, spontaneous ventilation and room air  Hydration status: euvolemic  Follow-up not needed.        Visit Vitals  BP (!) 99/58 (BP Location: Left arm, Patient Position: Lying)   Pulse 62   Temp 36.9 °C (98.5 °F) (Oral)   Resp 14   Ht 5' 6" (1.676 m)   Wt 84.4 kg (186 lb)   LMP 02/17/2018   SpO2 100%   BMI 30.02 kg/m²       Pain/Ezequiel Score: Pain Assessment Performed: Yes (3/14/2018  9:22 PM)  Presence of Pain: complains of pain/discomfort (3/14/2018  9:22 PM)  Pain Rating Prior to Med Admin: 5 (3/14/2018  9:00 PM)  Pain Rating Post Med Admin: 3 (3/14/2018  9:22 PM)  Ezequiel Score: 10 (3/14/2018  9:22 PM)      "

## 2018-03-15 NOTE — NURSING
Called to room by PCT. Pt in bathroom-noted saturated peripad and moderate amount of bleeding noted on green pad on bed.  Pt voided 200ml with moderate bleeding noted.  Pt denies weakness, dizziness, visual changes or diaphoresis.  JG=623/70 with pulse= 98.  Pericare provided. Ambulated back to bed with assistance stand-by.  Will continue to monitor.

## 2018-03-15 NOTE — PLAN OF CARE
Problem: Surgery Nonspecified (Adult)  Goal: Signs and Symptoms of Listed Potential Problems Will be Absent, Minimized or Managed (Surgery Nonspecified)  Signs and symptoms of listed potential problems will be absent, minimized or managed by discharge/transition of care (reference Surgery Nonspecified (Adult) CPG).   Outcome: Outcome(s) achieved Date Met: 03/14/18 03/14/18 2127   Surgery Nonspecified   Problems Assessed (Surgery) all   Problems Present (Surgery) none   Pain managed/tolerale/further pain management to be carried out on mother/bavy unit 2E  Goal: Anesthesia/Sedation Recovery  Outcome: Outcome(s) achieved Date Met: 03/14/18 03/14/18 2127   Goal/Outcome Evaluation   Anesthesia/Sedation Recovery criteria met for transfer

## 2018-03-15 NOTE — PROGRESS NOTES
Pt. Coming back from restroom, peripad noted to have bright red vaginal bleeding approx. The size of a half of a dollar bill. Pt. Family at bedside.

## 2018-03-15 NOTE — PROGRESS NOTES
Pt. Laura pad with scant amount of blood on it, pt. Denies feeling dizzy, weak, palpitations, or sob while laying in bed nor during ambulation. Brother and s.o. At pt. Bedside. Will continue to monitor. IV converted to Sl.

## 2018-03-15 NOTE — PROGRESS NOTES
Dr. Lew called unit and spoke with tere about pt., no new orders noted. He also called pt. On room phone and spoke with patient. Message no need to call him back from nurse stand point.

## 2018-03-16 ENCOUNTER — ANESTHESIA (OUTPATIENT)
Dept: SURGERY | Facility: HOSPITAL | Age: 43
DRG: 908 | End: 2018-03-16
Payer: COMMERCIAL

## 2018-03-16 ENCOUNTER — ANESTHESIA EVENT (OUTPATIENT)
Dept: SURGERY | Facility: HOSPITAL | Age: 43
DRG: 908 | End: 2018-03-16
Payer: COMMERCIAL

## 2018-03-16 PROBLEM — N93.9 PROFUSE VAGINAL BLEEDING: Status: RESOLVED | Noted: 2018-03-14 | Resolved: 2018-03-16

## 2018-03-16 LAB
ALBUMIN SERPL BCP-MCNC: 2.7 G/DL
ALP SERPL-CCNC: 72 U/L
ALT SERPL W/O P-5'-P-CCNC: 19 U/L
ANION GAP SERPL CALC-SCNC: 5 MMOL/L
APTT BLDCRRT: 23.6 SEC
AST SERPL-CCNC: 17 U/L
BASOPHILS # BLD AUTO: 0.04 K/UL
BASOPHILS NFR BLD: 0.5 %
BILIRUB SERPL-MCNC: 0.5 MG/DL
BLD PROD TYP BPU: NORMAL
BLOOD UNIT EXPIRATION DATE: NORMAL
BLOOD UNIT TYPE CODE: 5100
BLOOD UNIT TYPE CODE: 9500
BLOOD UNIT TYPE: NORMAL
BUN SERPL-MCNC: 7 MG/DL
CALCIUM SERPL-MCNC: 8.6 MG/DL
CHLORIDE SERPL-SCNC: 111 MMOL/L
CO2 SERPL-SCNC: 25 MMOL/L
CODING SYSTEM: NORMAL
CREAT SERPL-MCNC: 0.8 MG/DL
D DIMER PPP IA.FEU-MCNC: 3.94 MG/L FEU
DIFFERENTIAL METHOD: ABNORMAL
DISPENSE STATUS: NORMAL
EOSINOPHIL # BLD AUTO: 0.3 K/UL
EOSINOPHIL NFR BLD: 3.2 %
ERYTHROCYTE [DISTWIDTH] IN BLOOD BY AUTOMATED COUNT: 16.4 %
ERYTHROCYTE [DISTWIDTH] IN BLOOD BY AUTOMATED COUNT: 16.6 %
EST. GFR  (AFRICAN AMERICAN): >60 ML/MIN/1.73 M^2
EST. GFR  (NON AFRICAN AMERICAN): >60 ML/MIN/1.73 M^2
FIBRINOGEN PPP-MCNC: 312 MG/DL
GLUCOSE SERPL-MCNC: 98 MG/DL
HCT VFR BLD AUTO: 22.3 %
HCT VFR BLD AUTO: 23.1 %
HGB BLD-MCNC: 7 G/DL
HGB BLD-MCNC: 7.3 G/DL
INR PPP: 1
LYMPHOCYTES # BLD AUTO: 1.5 K/UL
LYMPHOCYTES NFR BLD: 16.3 %
MCH RBC QN AUTO: 26.6 PG
MCH RBC QN AUTO: 26.9 PG
MCHC RBC AUTO-ENTMCNC: 31.4 G/DL
MCHC RBC AUTO-ENTMCNC: 31.6 G/DL
MCV RBC AUTO: 84 FL
MCV RBC AUTO: 86 FL
MONOCYTES # BLD AUTO: 0.9 K/UL
MONOCYTES NFR BLD: 9.8 %
NEUTROPHILS # BLD AUTO: 6.2 K/UL
NEUTROPHILS NFR BLD: 69.9 %
NUM UNITS TRANS FFP: NORMAL
NUM UNITS TRANS FFP: NORMAL
PLATELET # BLD AUTO: 182 K/UL
PLATELET # BLD AUTO: 198 K/UL
PMV BLD AUTO: 10.4 FL
PMV BLD AUTO: 10.8 FL
POTASSIUM SERPL-SCNC: 3.8 MMOL/L
PROT SERPL-MCNC: 5.6 G/DL
PROTHROMBIN TIME: 10.7 SEC
RBC # BLD AUTO: 2.6 M/UL
RBC # BLD AUTO: 2.74 M/UL
SODIUM SERPL-SCNC: 141 MMOL/L
TRANS ERYTHROCYTES VOL PATIENT: NORMAL ML
TRANS ERYTHROCYTES VOL PATIENT: NORMAL ML
WBC # BLD AUTO: 18.68 K/UL
WBC # BLD AUTO: 8.88 K/UL

## 2018-03-16 PROCEDURE — 36415 COLL VENOUS BLD VENIPUNCTURE: CPT

## 2018-03-16 PROCEDURE — 37000009 HC ANESTHESIA EA ADD 15 MINS: Performed by: OBSTETRICS & GYNECOLOGY

## 2018-03-16 PROCEDURE — 25000003 PHARM REV CODE 250: Performed by: NURSE ANESTHETIST, CERTIFIED REGISTERED

## 2018-03-16 PROCEDURE — P9021 RED BLOOD CELLS UNIT: HCPCS

## 2018-03-16 PROCEDURE — 71000033 HC RECOVERY, INTIAL HOUR: Performed by: OBSTETRICS & GYNECOLOGY

## 2018-03-16 PROCEDURE — 37000008 HC ANESTHESIA 1ST 15 MINUTES: Performed by: OBSTETRICS & GYNECOLOGY

## 2018-03-16 PROCEDURE — 85384 FIBRINOGEN ACTIVITY: CPT

## 2018-03-16 PROCEDURE — D9220A PRA ANESTHESIA: Mod: ANES,,, | Performed by: ANESTHESIOLOGY

## 2018-03-16 PROCEDURE — 85379 FIBRIN DEGRADATION QUANT: CPT

## 2018-03-16 PROCEDURE — 80053 COMPREHEN METABOLIC PANEL: CPT

## 2018-03-16 PROCEDURE — 36000707: Performed by: OBSTETRICS & GYNECOLOGY

## 2018-03-16 PROCEDURE — 0UQG7ZZ REPAIR VAGINA, VIA NATURAL OR ARTIFICIAL OPENING: ICD-10-PCS | Performed by: OBSTETRICS & GYNECOLOGY

## 2018-03-16 PROCEDURE — 63600175 PHARM REV CODE 636 W HCPCS: Performed by: NURSE ANESTHETIST, CERTIFIED REGISTERED

## 2018-03-16 PROCEDURE — D9220A PRA ANESTHESIA: Mod: CRNA,,, | Performed by: NURSE ANESTHETIST, CERTIFIED REGISTERED

## 2018-03-16 PROCEDURE — 27100025 HC TUBING, SET FLUID WARMER: Performed by: NURSE ANESTHETIST, CERTIFIED REGISTERED

## 2018-03-16 PROCEDURE — 85730 THROMBOPLASTIN TIME PARTIAL: CPT

## 2018-03-16 PROCEDURE — P9017 PLASMA 1 DONOR FRZ W/IN 8 HR: HCPCS

## 2018-03-16 PROCEDURE — 25000003 PHARM REV CODE 250: Performed by: OBSTETRICS & GYNECOLOGY

## 2018-03-16 PROCEDURE — 71000039 HC RECOVERY, EACH ADD'L HOUR: Performed by: OBSTETRICS & GYNECOLOGY

## 2018-03-16 PROCEDURE — 36000706: Performed by: OBSTETRICS & GYNECOLOGY

## 2018-03-16 PROCEDURE — 85610 PROTHROMBIN TIME: CPT

## 2018-03-16 PROCEDURE — 85027 COMPLETE CBC AUTOMATED: CPT

## 2018-03-16 PROCEDURE — 85025 COMPLETE CBC W/AUTO DIFF WBC: CPT

## 2018-03-16 PROCEDURE — 25000003 PHARM REV CODE 250: Performed by: ANESTHESIOLOGY

## 2018-03-16 PROCEDURE — 11000001 HC ACUTE MED/SURG PRIVATE ROOM

## 2018-03-16 PROCEDURE — 63600175 PHARM REV CODE 636 W HCPCS: Performed by: OBSTETRICS & GYNECOLOGY

## 2018-03-16 RX ORDER — HYDROCODONE BITARTRATE AND ACETAMINOPHEN 500; 5 MG/1; MG/1
TABLET ORAL
Status: DISCONTINUED | OUTPATIENT
Start: 2018-03-16 | End: 2018-03-16

## 2018-03-16 RX ORDER — METOCLOPRAMIDE HYDROCHLORIDE 5 MG/ML
INJECTION INTRAMUSCULAR; INTRAVENOUS
Status: DISCONTINUED | OUTPATIENT
Start: 2018-03-16 | End: 2018-03-16

## 2018-03-16 RX ORDER — LIDOCAINE HCL/PF 100 MG/5ML
SYRINGE (ML) INTRAVENOUS
Status: DISCONTINUED | OUTPATIENT
Start: 2018-03-16 | End: 2018-03-16

## 2018-03-16 RX ORDER — MIDAZOLAM HYDROCHLORIDE 1 MG/ML
INJECTION, SOLUTION INTRAMUSCULAR; INTRAVENOUS
Status: DISCONTINUED | OUTPATIENT
Start: 2018-03-16 | End: 2018-03-16

## 2018-03-16 RX ORDER — GLYCOPYRROLATE 0.2 MG/ML
INJECTION INTRAMUSCULAR; INTRAVENOUS
Status: DISCONTINUED | OUTPATIENT
Start: 2018-03-16 | End: 2018-03-16

## 2018-03-16 RX ORDER — SODIUM CHLORIDE 9 MG/ML
INJECTION, SOLUTION INTRAVENOUS CONTINUOUS
Status: DISCONTINUED | OUTPATIENT
Start: 2018-03-16 | End: 2018-03-18 | Stop reason: HOSPADM

## 2018-03-16 RX ORDER — ONDANSETRON 2 MG/ML
INJECTION INTRAMUSCULAR; INTRAVENOUS
Status: DISCONTINUED | OUTPATIENT
Start: 2018-03-16 | End: 2018-03-16

## 2018-03-16 RX ORDER — DIPHENHYDRAMINE HCL 25 MG
25 CAPSULE ORAL
Status: DISCONTINUED | OUTPATIENT
Start: 2018-03-16 | End: 2018-03-16

## 2018-03-16 RX ORDER — HYDROMORPHONE HYDROCHLORIDE 1 MG/ML
0.2 INJECTION, SOLUTION INTRAMUSCULAR; INTRAVENOUS; SUBCUTANEOUS EVERY 5 MIN PRN
Status: DISCONTINUED | OUTPATIENT
Start: 2018-03-16 | End: 2018-03-16 | Stop reason: HOSPADM

## 2018-03-16 RX ORDER — FENTANYL CITRATE 50 UG/ML
INJECTION, SOLUTION INTRAMUSCULAR; INTRAVENOUS
Status: DISCONTINUED | OUTPATIENT
Start: 2018-03-16 | End: 2018-03-16

## 2018-03-16 RX ORDER — PHENYLEPHRINE HYDROCHLORIDE 10 MG/ML
INJECTION INTRAVENOUS
Status: DISCONTINUED | OUTPATIENT
Start: 2018-03-16 | End: 2018-03-16

## 2018-03-16 RX ORDER — SODIUM CHLORIDE, SODIUM LACTATE, POTASSIUM CHLORIDE, CALCIUM CHLORIDE 600; 310; 30; 20 MG/100ML; MG/100ML; MG/100ML; MG/100ML
INJECTION, SOLUTION INTRAVENOUS CONTINUOUS PRN
Status: DISCONTINUED | OUTPATIENT
Start: 2018-03-16 | End: 2018-03-16

## 2018-03-16 RX ORDER — SODIUM CHLORIDE 9 MG/ML
INJECTION, SOLUTION INTRAVENOUS CONTINUOUS PRN
Status: DISCONTINUED | OUTPATIENT
Start: 2018-03-16 | End: 2018-03-16

## 2018-03-16 RX ORDER — SUCCINYLCHOLINE CHLORIDE 20 MG/ML
INJECTION INTRAMUSCULAR; INTRAVENOUS
Status: DISCONTINUED | OUTPATIENT
Start: 2018-03-16 | End: 2018-03-16

## 2018-03-16 RX ORDER — NEOSTIGMINE METHYLSULFATE 1 MG/ML
INJECTION, SOLUTION INTRAVENOUS
Status: DISCONTINUED | OUTPATIENT
Start: 2018-03-16 | End: 2018-03-16

## 2018-03-16 RX ORDER — SODIUM CHLORIDE 0.9 % (FLUSH) 0.9 %
3 SYRINGE (ML) INJECTION
Status: DISCONTINUED | OUTPATIENT
Start: 2018-03-16 | End: 2018-03-18 | Stop reason: HOSPADM

## 2018-03-16 RX ORDER — PROPOFOL 10 MG/ML
VIAL (ML) INTRAVENOUS
Status: DISCONTINUED | OUTPATIENT
Start: 2018-03-16 | End: 2018-03-16

## 2018-03-16 RX ORDER — ROCURONIUM BROMIDE 10 MG/ML
INJECTION, SOLUTION INTRAVENOUS
Status: DISCONTINUED | OUTPATIENT
Start: 2018-03-16 | End: 2018-03-16

## 2018-03-16 RX ORDER — ACETAMINOPHEN 325 MG/1
650 TABLET ORAL
Status: DISCONTINUED | OUTPATIENT
Start: 2018-03-16 | End: 2018-03-16

## 2018-03-16 RX ORDER — ONDANSETRON 4 MG/1
8 TABLET, ORALLY DISINTEGRATING ORAL EVERY 8 HOURS PRN
Status: DISCONTINUED | OUTPATIENT
Start: 2018-03-16 | End: 2018-03-16 | Stop reason: SDUPTHER

## 2018-03-16 RX ORDER — ACETAMINOPHEN 325 MG/1
650 TABLET ORAL EVERY 4 HOURS PRN
Status: DISCONTINUED | OUTPATIENT
Start: 2018-03-16 | End: 2018-03-18 | Stop reason: HOSPADM

## 2018-03-16 RX ORDER — SODIUM CHLORIDE 0.9 % (FLUSH) 0.9 %
3 SYRINGE (ML) INJECTION
Status: DISCONTINUED | OUTPATIENT
Start: 2018-03-16 | End: 2018-03-16 | Stop reason: HOSPADM

## 2018-03-16 RX ORDER — OXYCODONE HYDROCHLORIDE 5 MG/1
10 TABLET ORAL EVERY 4 HOURS PRN
Status: DISCONTINUED | OUTPATIENT
Start: 2018-03-16 | End: 2018-03-18 | Stop reason: HOSPADM

## 2018-03-16 RX ORDER — ONDANSETRON 2 MG/ML
4 INJECTION INTRAMUSCULAR; INTRAVENOUS ONCE AS NEEDED
Status: DISCONTINUED | OUTPATIENT
Start: 2018-03-16 | End: 2018-03-16 | Stop reason: HOSPADM

## 2018-03-16 RX ORDER — OXYCODONE HYDROCHLORIDE 5 MG/1
5 TABLET ORAL EVERY 4 HOURS PRN
Status: DISCONTINUED | OUTPATIENT
Start: 2018-03-16 | End: 2018-03-18 | Stop reason: HOSPADM

## 2018-03-16 RX ADMIN — PHENYLEPHRINE HYDROCHLORIDE 100 MCG: 10 INJECTION INTRAVENOUS at 10:03

## 2018-03-16 RX ADMIN — CEFTRIAXONE 2 G: 1 INJECTION, SOLUTION INTRAVENOUS at 10:03

## 2018-03-16 RX ADMIN — MIDAZOLAM HYDROCHLORIDE 2 MG: 1 INJECTION, SOLUTION INTRAMUSCULAR; INTRAVENOUS at 09:03

## 2018-03-16 RX ADMIN — GLYCOPYRROLATE 0.6 MG: 0.2 INJECTION, SOLUTION INTRAMUSCULAR; INTRAVENOUS at 10:03

## 2018-03-16 RX ADMIN — ONDANSETRON 4 MG: 2 INJECTION, SOLUTION INTRAMUSCULAR; INTRAVENOUS at 11:03

## 2018-03-16 RX ADMIN — PROPOFOL 80 MG: 10 INJECTION, EMULSION INTRAVENOUS at 10:03

## 2018-03-16 RX ADMIN — IBUPROFEN 600 MG: 600 TABLET, FILM COATED ORAL at 06:03

## 2018-03-16 RX ADMIN — SODIUM CHLORIDE: 0.9 INJECTION, SOLUTION INTRAVENOUS at 10:03

## 2018-03-16 RX ADMIN — PHENYLEPHRINE HYDROCHLORIDE 200 MCG: 10 INJECTION INTRAVENOUS at 10:03

## 2018-03-16 RX ADMIN — FENTANYL CITRATE 50 MCG: 50 INJECTION INTRAMUSCULAR; INTRAVENOUS at 11:03

## 2018-03-16 RX ADMIN — SODIUM CHLORIDE, SODIUM LACTATE, POTASSIUM CHLORIDE, AND CALCIUM CHLORIDE: .6; .31; .03; .02 INJECTION, SOLUTION INTRAVENOUS at 09:03

## 2018-03-16 RX ADMIN — LIDOCAINE HYDROCHLORIDE 100 MG: 20 INJECTION, SOLUTION INTRAVENOUS at 10:03

## 2018-03-16 RX ADMIN — FENTANYL CITRATE 50 MCG: 50 INJECTION INTRAMUSCULAR; INTRAVENOUS at 10:03

## 2018-03-16 RX ADMIN — ROCURONIUM BROMIDE 30 MG: 10 INJECTION, SOLUTION INTRAVENOUS at 10:03

## 2018-03-16 RX ADMIN — CEFTRIAXONE 2 G: 2 INJECTION, SOLUTION INTRAVENOUS at 10:03

## 2018-03-16 RX ADMIN — ROCURONIUM BROMIDE 10 MG: 10 INJECTION, SOLUTION INTRAVENOUS at 10:03

## 2018-03-16 RX ADMIN — OXYCODONE HYDROCHLORIDE 5 MG: 5 TABLET ORAL at 04:03

## 2018-03-16 RX ADMIN — METOCLOPRAMIDE 10 MG: 5 INJECTION, SOLUTION INTRAMUSCULAR; INTRAVENOUS at 09:03

## 2018-03-16 RX ADMIN — NEOSTIGMINE METHYLSULFATE 5 MG: 1 INJECTION INTRAVENOUS at 10:03

## 2018-03-16 RX ADMIN — ACETAMINOPHEN 650 MG: 325 TABLET ORAL at 09:03

## 2018-03-16 RX ADMIN — FENTANYL CITRATE 100 MCG: 50 INJECTION INTRAMUSCULAR; INTRAVENOUS at 10:03

## 2018-03-16 RX ADMIN — SUCCINYLCHOLINE CHLORIDE 120 MG: 20 INJECTION, SOLUTION INTRAMUSCULAR; INTRAVENOUS at 10:03

## 2018-03-16 NOTE — PROGRESS NOTES
[facility]  Obstetrics & Gynecology  Progress Note    Patient Name: Nuria Franklin  MRN: [id number]  Admission Date: 3/14/2018  Primary Care Provider: Primary Doctor No  Principal Problem: <principal problem not specified>    Subjective:     Interval History: patient started bleeding again last night and now appears to be bleeding at vigorous rate. Her H/H dropped again overnight and she is now soaking a pad every 20 minutes or so. She has been NPO since midnight and she is ready for reexploration. Her CT Scan of pelvis and abdomen done last night did not show any bleeding and no hydronephrosis. IR has been consulted to see if they had anything to offer but because she is bleeding so briskly we will take her to surgery and reopen the cuff and see if we can achieve hemostasis. The patient and he daughter have been advised of our findings and consent to treatment.    Scheduled Meds:  Continuous Infusions:   sodium chloride 0.9% 125 mL/hr (03/14/18 1612)    sodium chloride 0.9% 125 mL/hr at 03/14/18 2233     PRN Meds:sodium chloride, sodium chloride, sodium chloride, acetaminophen, diphenhydrAMINE, ibuprofen, ondansetron, oxyCODONE-acetaminophen, oxyCODONE-acetaminophen, promethazine (PHENERGAN) IVPB, sodium chloride 0.9%    Review of patient's allergies indicates:  No Known Allergies    Objective:     Vital Signs (Most Recent):  Temp: 98.2 °F (36.8 °C) (03/16/18 0845)  Pulse: (!) 130 (03/16/18 0845)  Resp: 20 (03/16/18 0845)  BP: 123/85 (03/16/18 0845)  SpO2: 99 % (03/16/18 0345) Vital Signs (24h Range):  Temp:  [97.6 °F (36.4 °C)-98.5 °F (36.9 °C)] 98.2 °F (36.8 °C)  Pulse:  [] 130  Resp:  [16-20] 20  SpO2:  [99 %-100 %] 99 %  BP: ()/(55-93) 123/85     Weight: 84.4 kg (186 lb)  Body mass index is 30.02 kg/m².  Patient's last menstrual period was 02/17/2018.    I&O (Last 24H):    Intake/Output Summary (Last 24 hours) at 03/16/18 0920  Last data filed at 03/16/18 0839   Gross per 24  hour   Intake             1112 ml   Output             4327 ml   Net            -3215 ml       Physical Exam    Laboratory:  CBC:   Recent Labs  Lab 03/16/18  0620   WBC 8.88   RBC 2.60*   HGB 7.0*   HCT 22.3*      MCV 86   MCH 26.9*   MCHC 31.4*     CMP:   Recent Labs  Lab 03/16/18  0620   GLU 98   CALCIUM 8.6*   ALBUMIN 2.7*   PROT 5.6*      K 3.8   CO2 25   *   BUN 7   CREATININE 0.8   ALKPHOS 72   ALT 19   AST 17   BILITOT 0.5     I have personallly reviewed all pertinent lab results from the last 24 hours.    Diagnostic Results:  Labs: Reviewed  PLAN is for exploration of vaginal cuff    Assessment/Plan:     Active Diagnoses:    Diagnosis Date Noted POA    Profuse vaginal bleeding [N93.9] 03/14/2018 Yes      Problems Resolved During this Admission:    Diagnosis Date Noted Date Resolved POA         Will do exploratory if necessary.    Antonio Lew MD  Obstetrics & Gynecology  [facility]

## 2018-03-16 NOTE — TRANSFER OF CARE
"Anesthesia Transfer of Care Note    Patient: Nuria Franklin    Procedure(s) Performed: Procedure(s) (LRB):  EXAM UNDER ANESTHESIA  VAGINALLY (N/A)  LAPAROTOMY  POSS (N/A)    Patient location: PACU    Anesthesia Type: general    Transport from OR: Transported from OR on room air with adequate spontaneous ventilation    Post pain: adequate analgesia    Post assessment: no apparent anesthetic complications and tolerated procedure well    Post vital signs: stable    Level of consciousness: sedated and responds to stimulation    Nausea/Vomiting: no nausea/vomiting    Complications: none    Transfer of care protocol was followed      Last vitals:   Visit Vitals  /64 (BP Location: Right arm, Patient Position: Lying)   Pulse 99   Temp 37.1 °C (98.8 °F) (Oral)   Resp 18   Ht 5' 6" (1.676 m)   Wt 84.4 kg (186 lb)   LMP 02/17/2018   SpO2 100%   Breastfeeding? No   BMI 30.02 kg/m²     "

## 2018-03-16 NOTE — NURSING
Called to room c/o bleeding vaginally.  Large towel b/t legs saturated w/ bright red blood, and green bed pad soaked.  Pad changed.

## 2018-03-16 NOTE — BRIEF OP NOTE
[facility]  Brief Operative Note    SUMMARY     Surgery Date: 3/16/2018     Surgeon(s) and Role:     * Rashaad Bob MD - Assisting     * Antonio Lew MD - Primary        Pre-op Diagnosis:  Post Operative Bleeding [R58]    Post-op Diagnosis:  Post-Op Diagnosis Codes:     *Post Operative  Bleeding [R58]    Procedure(s) (LRB):  EXAM UNDER ANESTHESIA  VAGINALLY (N/A)  LAPAROTOMY  POSS (N/A)    Anesthesia: General    Description of Procedure: Examination under anesthesia, Sutures removed from vaginal cuff, entire cuff run from left to right with 0-Vicryl    Description of the findings of the procedure: several large clots observed at vulva and when cuff was opened. No pumper noted just generalized oozing from cuff. Repaired cuff and vagina was completely dry on termination of the case.    Estimated Blood Loss: 50 mL         Specimens:   Specimen (12h ago through future)    None

## 2018-03-16 NOTE — ANESTHESIA PREPROCEDURE EVALUATION
03/16/2018  Nuria Franklin is a 42 y.o., female who had a TLH  and subsequent bring back for bleeding two weeks ago. Plan to return to OR for EUA to identify suspected vaginal cuff bleed and also to investigate for any abdominal or pelvic sources of bleeding. Patient is stable, but tachycardic and hypotensive from baseline.   Obtained anesthesia and blood consent.  Third bring back for bleeding s/p hysterectomy.  Pre-op Assessment     I have reviewed the Nursing Notes.      Review of Systems  Anesthesia Hx:  No problems with previous Anesthesia  History of prior surgery of interest to airway management or planning:  Denies Personal Hx of Anesthesia complications.   Social:  Non-Smoker    Hematology/Oncology:         -- Anemia:   Cardiovascular:   Exercise tolerance: good Denies Pacemaker. Hypertension  Denies Valvular problems/Murmurs.  Denies MI.  Denies CAD.    Denies CABG/stent.  Denies Dysrhythmias.   Denies Angina.             denies PVD no hyperlipidemia        Pulmonary:  Pulmonary Normal    Renal/:  Renal/ Normal     Hepatic/GI:  Hepatic/GI Normal    OB/GYN/PEDS:  Vaginal bleeding following recent hysterectomy   Neurological:  Neurology Normal    Endocrine:  Endocrine Normal        Physical Exam  General:  Obesity    Airway/Jaw/Neck:  Airway Findings: Mouth Opening: Normal Tongue: Normal  Mallampati: II  TM Distance: Normal, at least 6 cm      Dental:  Dental Findings: Prominent Incisors   Chest/Lungs:  Chest/Lungs Findings: Clear to auscultation     Heart/Vascular:  Heart Findings: Rate: Tachycardia  Rhythm: Regular Rhythm        Mental Status:  Mental Status Findings: Normal        Anesthesia Plan  Type of Anesthesia, risks & benefits discussed:  Anesthesia Type:  general  Patient's Preference:   Intra-op Monitoring Plan: standard ASA monitors  Intra-op Monitoring Plan Comments:   Post Op  Pain Control Plan: multimodal analgesia, IV/PO Opioids PRN and per primary service following discharge from PACU  Post Op Pain Control Plan Comments:   Induction:   IV  Beta Blocker:  Patient is not currently on a Beta-Blocker (No further documentation required).       Informed Consent: Patient understands risks and agrees with Anesthesia plan.  Questions answered. Anesthesia consent signed with patient.  ASA Score: 2  emergent   Day of Surgery Review of History & Physical:    H&P update referred to the surgeon.     Anesthesia Plan Notes: NPO Adequate        Ready For Surgery From Anesthesia Perspective.

## 2018-03-16 NOTE — ANESTHESIA POSTPROCEDURE EVALUATION
"Anesthesia Post Evaluation    Patient: Nuria Franklin    Procedure(s) Performed: Procedure(s) (LRB):  EXAM UNDER ANESTHESIA  VAGINALLY (N/A)  LAPAROTOMY  POSS (N/A)    Final Anesthesia Type: general  Patient location during evaluation: PACU  Patient participation: Yes- Able to Participate  Level of consciousness: awake and alert and oriented  Post-procedure vital signs: reviewed and stable  Pain management: adequate  Airway patency: patent  PONV status at discharge: No PONV  Anesthetic complications: no      Cardiovascular status: blood pressure returned to baseline and hemodynamically stable  Respiratory status: unassisted, spontaneous ventilation and room air  Hydration status: euvolemic  Follow-up not needed.        Visit Vitals  /69   Pulse 86   Temp 35.6 °C (96.1 °F) (Oral)   Resp 18   Ht 5' 6" (1.676 m)   Wt 84.4 kg (186 lb)   LMP 02/17/2018   SpO2 99%   Breastfeeding? No   BMI 30.02 kg/m²       Pain/Ezequiel Score: Pain Assessment Performed: Yes (3/16/2018 12:45 PM)  Presence of Pain: denies (3/16/2018 12:45 PM)  Pain Rating Prior to Med Admin: 0 (3/16/2018 11:07 AM)  Pain Rating Post Med Admin: 0 (3/16/2018 12:09 AM)  Ezequiel Score: 9 (3/16/2018 12:18 PM)      "

## 2018-03-16 NOTE — OP NOTE
DATE OF PROCEDURE:  03/14/2018    PREOPERATIVE DIAGNOSES:  1.  Severe postoperative bleeding.  2.  Bleeding from the vagina.    POSTOPERATIVE DIAGNOSES:  1.  Severe postoperative bleeding.  2.  Bleeding from the vagina.    SURGICAL PROCEDURE PERFORMED:  1. Attempted vaginal repair, converted to an   2. Exploratory laparotomy and repair of bleeding.    SURGEON:  Antonio Lew M.D.    ANESTHESIA:  General.    ESTIMATED BLOOD LOSS AT SURGERY:  Less than 150 mL.    COMPLICATIONS:  None.    DETAILS:  Ms. Adalberto Franklin is a 42-year-old who underwent laparoscopic PETTY-BS   on 03/02/2018.  Later that evening, she began to have bleeding and was taken   back to the Operating Room where the vaginal cuff was reapproximated.  The   patient reports that for the last 12 days, she has been at home with minimal   activity when all of a sudden she started having bleeding.  She passed what she   described as aboot 150 mL of blood at home before she came into the ED.  At   the time, I saw her in the Emergency Room, she had about 600 mL of blood and   clots in her bedpan.  She was also tachying with a heart rate of 145 and her   blood pressure was down.  We requested that she be prepared for a stat   exploration and we started her on  pRBCs immediately.  At surgery, a time-out   was held and the patient was identified by name, date of birth and procedure to   be performed.  Examination under anesthesia revealed that the patient had a hole   in the center of the vaginal cuff where there were no sutures, but the cuff was   intact.  She was noted to have 3 interrupted sutures on the left side that were   not oozing.  The oozing appeared to be coming from the upper right hand side   outside of the area of the cuff.  Decision was made in light of the fact that   she had done so much bleeding previously to do an open laparotomy.    Consequently a second timeout was held. A sharp knife was used to make a cut in   the abdomen and this was  "taken down sharply to the fascia, which was run   laterally.  The rectus muscle dissected from the fascia, split in the midline   and the underlying parietal peritoneum was entered sharply after ensuring there   was no underlying viscus.  What was immediately obvious was that patient's   tissue was all blanched that there was no blood in subcutaneous tissue.  The   peritoneal incision was extended cephalad and caudad.  An O'Esequiel-O'Palomares   self-retaining retractor was then placed.  The bladder and the bowels were   packed off and we examined the pelvic area.  The patient did not have a   significant amount of blood in the cul-de-sac.  She did have on the right side   what appeared to be a cervical branch of the uterine that was pumping   sporadically and this was cauterized and the bleeding stopped.  We irrigated the   area copiously and in an abundance of caution, we used 0 Vicryl full-length   suture to reinforce the cuff starting on the left side and proceeding all the way over to   the right side of the vaginal cuff.  We noted that lots of the pedicles   appeared to have fresh blood suggesting that the "sealing" was not holding up and   we used a Bovie to cauterize these areas and irrigated to provide good   hemostasis.  This having been accomplished and in an abundance of caution, we   applied some of the corn starch anticoagulant down over the vaginal cuff and   after all the sponge, lap and instrument counts were noted to be correct, we   removed the O'Esequiel-O'Palomares self-retaining retractor.  Closure of the   abdomen was accomplished by using 2-0 chromic for the parietal peritoneum and   the muscles, 0 Vicryl for the fascia, 2-0 Vicryl for the subcutaneous and Insorb   skin staples for the skin.  Following this procedure, a sponge on a stick that   was inside the vagina was noted to be damp.  We again reexamined the patient   vaginally and we reinforced from below, i.e., from the vagina and cuff.  At " that   time, we terminated the procedure, the patient was completely dry inside and   out.  We subsequently transferred the patient to Recovery Room and went and   spoke to the patient's  and daughter and informed them that the procedure had   been done and what our findings were.            /jessica [id number] venita(s)        CRISTI  dd: 03/16/2018 09:19:50 (CDT)  td: 03/16/2018 13:11:07 (CDT)  Doc ID   #[id number]  Job ID #[id number]    CC: Aamir Lopez M.D.    [id number]

## 2018-03-16 NOTE — PROGRESS NOTES
Consult placed to Gilberto Roper MD Hematologist, who will evaluate patient later this afternoon for possible Medical Bleeding.

## 2018-03-16 NOTE — PROGRESS NOTES
Pt returned from CT via w/c at 1910.   Pt found in room ambulating in bathroom. Denies dizziness, sob, lightheadedness.   Blood tinged urine found in container with a 36g clot. Arturo pad had a light amount of bright red blood.   VSS. Denies pain.   Abdomen soft, distended, not painful to the touch as per pt.   Pending CT results  Encouraged PO fluids.     2100: vaginal bleeding is scant on per pad. Pt remains asymptomatic    VSS, passing flatus, abdomen remains soft. Pain is relieved with motrin as per pt. Encouraged ambulation and PO. No further orders at this time. Will introduce full liquids as ordered.     0345: pt ambulated to bathroom, bleeding light on arturo pad, passed 9g blood clot, remains asymptomatic     0400: ambulated in [place], asymptomatic, arturo pad bleeding scant. VSS    0420: notified on call MD dr. Khan of pt's continuous bleed with passing of 2 clots. Ordered cbc, cmp, pt/ptt/inr.     0600: ambulated to bathroom, 32g vaginal clot expelled during urination, remains asymptomatic, notified Dr. Khan. Patient changed from full liquids to NPO. Notified patient.     0655: small clots expelled while urinating, pt denies symptoms such as dizziness, nausea, sob, or light headed. Changed pad, seen arturo pad in trash can heavy amount on pad viewed.     0700: notified Dr. Khan of heavy amount of blood on last arturo pad within 40 minutes. VSS. No further intervention/orders provided.

## 2018-03-16 NOTE — PROGRESS NOTES
Patient examined by me. The nurses report that she had started oozing blood from the vagina real light on her pad .request was made for CT Scan and repeat CBC. her vital signs are stable and she denies SOB or other discomfort. We have ordered coagulation studies.

## 2018-03-16 NOTE — NURSING
S/W Dr. Lew regarding bleeding and decreased H/H 7.0/22.3.  New orders for d-dimer, fibrinogen, and IR consult rec'd.  Pt informed.  Daughter at bedside.  r hand IV flushed w/ 0.9% ns w/o diff.  1 liter 0.9% ns.

## 2018-03-17 PROBLEM — N99.820 POSTOPERATIVE VAGINAL BLEEDING FOLLOWING GENITOURINARY PROCEDURE: Status: ACTIVE | Noted: 2018-03-14

## 2018-03-17 PROBLEM — N93.9 ABNORMAL UTERINE BLEEDING (AUB): Status: RESOLVED | Noted: 2018-02-23 | Resolved: 2018-03-17

## 2018-03-17 LAB
ALBUMIN SERPL BCP-MCNC: 2.5 G/DL
ALP SERPL-CCNC: 64 U/L
ALT SERPL W/O P-5'-P-CCNC: 14 U/L
ANION GAP SERPL CALC-SCNC: 3 MMOL/L
AST SERPL-CCNC: 14 U/L
BASOPHILS # BLD AUTO: 0.04 K/UL
BASOPHILS NFR BLD: 0.3 %
BILIRUB SERPL-MCNC: 0.7 MG/DL
BLD PROD TYP BPU: NORMAL
BLD PROD TYP BPU: NORMAL
BLOOD UNIT EXPIRATION DATE: NORMAL
BLOOD UNIT EXPIRATION DATE: NORMAL
BLOOD UNIT TYPE CODE: 5100
BLOOD UNIT TYPE CODE: 5100
BLOOD UNIT TYPE: NORMAL
BLOOD UNIT TYPE: NORMAL
BUN SERPL-MCNC: 6 MG/DL
CALCIUM SERPL-MCNC: 8.1 MG/DL
CHLORIDE SERPL-SCNC: 113 MMOL/L
CO2 SERPL-SCNC: 25 MMOL/L
CODING SYSTEM: NORMAL
CODING SYSTEM: NORMAL
CREAT SERPL-MCNC: 0.7 MG/DL
DIFFERENTIAL METHOD: ABNORMAL
DISPENSE STATUS: NORMAL
DISPENSE STATUS: NORMAL
EOSINOPHIL # BLD AUTO: 0.3 K/UL
EOSINOPHIL NFR BLD: 2.6 %
ERYTHROCYTE [DISTWIDTH] IN BLOOD BY AUTOMATED COUNT: 17.1 %
EST. GFR  (AFRICAN AMERICAN): >60 ML/MIN/1.73 M^2
EST. GFR  (NON AFRICAN AMERICAN): >60 ML/MIN/1.73 M^2
GLUCOSE SERPL-MCNC: 101 MG/DL
HCT VFR BLD AUTO: 19.2 %
HGB BLD-MCNC: 6.2 G/DL
LYMPHOCYTES # BLD AUTO: 2 K/UL
LYMPHOCYTES NFR BLD: 16.2 %
MCH RBC QN AUTO: 26.6 PG
MCHC RBC AUTO-ENTMCNC: 32.3 G/DL
MCV RBC AUTO: 82 FL
MONOCYTES # BLD AUTO: 0.8 K/UL
MONOCYTES NFR BLD: 6.6 %
NEUTROPHILS # BLD AUTO: 9 K/UL
NEUTROPHILS NFR BLD: 75 %
PLATELET # BLD AUTO: 141 K/UL
PMV BLD AUTO: 10 FL
POTASSIUM SERPL-SCNC: 3.6 MMOL/L
PROT SERPL-MCNC: 4.8 G/DL
RBC # BLD AUTO: 2.33 M/UL
SODIUM SERPL-SCNC: 141 MMOL/L
TRANS ERYTHROCYTES VOL PATIENT: NORMAL ML
TRANS ERYTHROCYTES VOL PATIENT: NORMAL ML
WBC # BLD AUTO: 12.06 K/UL

## 2018-03-17 PROCEDURE — 25000003 PHARM REV CODE 250: Performed by: OBSTETRICS & GYNECOLOGY

## 2018-03-17 PROCEDURE — 11000001 HC ACUTE MED/SURG PRIVATE ROOM

## 2018-03-17 PROCEDURE — P9021 RED BLOOD CELLS UNIT: HCPCS

## 2018-03-17 PROCEDURE — 36415 COLL VENOUS BLD VENIPUNCTURE: CPT

## 2018-03-17 PROCEDURE — 63600175 PHARM REV CODE 636 W HCPCS: Performed by: OBSTETRICS & GYNECOLOGY

## 2018-03-17 PROCEDURE — 80053 COMPREHEN METABOLIC PANEL: CPT

## 2018-03-17 PROCEDURE — 99233 SBSQ HOSP IP/OBS HIGH 50: CPT | Mod: ,,, | Performed by: INTERNAL MEDICINE

## 2018-03-17 PROCEDURE — 85025 COMPLETE CBC W/AUTO DIFF WBC: CPT

## 2018-03-17 PROCEDURE — A4216 STERILE WATER/SALINE, 10 ML: HCPCS | Performed by: OBSTETRICS & GYNECOLOGY

## 2018-03-17 RX ORDER — HYDROCODONE BITARTRATE AND ACETAMINOPHEN 500; 5 MG/1; MG/1
TABLET ORAL
Status: DISCONTINUED | OUTPATIENT
Start: 2018-03-17 | End: 2018-03-18 | Stop reason: HOSPADM

## 2018-03-17 RX ADMIN — OXYCODONE HYDROCHLORIDE 5 MG: 5 TABLET ORAL at 05:03

## 2018-03-17 RX ADMIN — OXYCODONE HYDROCHLORIDE 5 MG: 5 TABLET ORAL at 09:03

## 2018-03-17 RX ADMIN — CEFTRIAXONE 2 G: 2 INJECTION, SOLUTION INTRAVENOUS at 12:03

## 2018-03-17 RX ADMIN — SODIUM CHLORIDE: 0.9 INJECTION, SOLUTION INTRAVENOUS at 12:03

## 2018-03-17 RX ADMIN — Medication 3 ML: at 05:03

## 2018-03-17 RX ADMIN — IBUPROFEN 600 MG: 600 TABLET, FILM COATED ORAL at 05:03

## 2018-03-17 NOTE — HOSPITAL COURSE
Pt had return to OR by Dr. Lew on 3/14 and again on 3/16 for vaginal bleeding.  On 3/16, vaginal cuff re-opened in OR and sewn closed vaginally.  Clots were noted in vaginal cuff but no bleeding abdominally.  On 3/17, there has been no further vaginal bleeding.  Pt 3/18/18, pt requesting d/c.

## 2018-03-17 NOTE — PROGRESS NOTES
Notified Dr. Lew of critical hemoglobin and hematocrit. Orders to notify Dr. Roper taken. Dr. Roper notified. Orders to transfuse 2 units PRBC received.

## 2018-03-17 NOTE — PROGRESS NOTES
[facility]  Hematology/Oncology  Progress Note    Patient Name: Nuria Franklin  Admission Date: 3/14/2018  Hospital Length of Stay: 3 days  Code Status: Full Code     Subjective:     HPI:  No notes on file    Interval History: Getting PRBC - 1st unit infusing    Oncology Treatment Plan:   [No treatment plan]    Medications:  Continuous Infusions:   sodium chloride 0.9% 125 mL/hr at 03/16/18 2239     Scheduled Meds:   cefTRIAXone (ROCEPHIN) IVPB  2 g Intravenous Q12H     PRN Meds:sodium chloride, acetaminophen, aminocaproic acid (AMICAR) bolus 5 g, ibuprofen, oxyCODONE, oxyCODONE, promethazine (PHENERGAN) IVPB, sodium chloride 0.9%     Review of Systems   HENT: Negative for mouth sores and nosebleeds.    Gastrointestinal: Negative for abdominal distention and abdominal pain.   Genitourinary: Negative for pelvic pain and vaginal bleeding.   Neurological: Negative for seizures and headaches.   Hematological: Negative for adenopathy.   Psychiatric/Behavioral: Negative for behavioral problems and confusion. The patient is nervous/anxious.      Objective:     Vital Signs (Most Recent):  Temp: 98.1 °F (36.7 °C) (03/17/18 1015)  Pulse: 76 (03/17/18 1015)  Resp: 16 (03/17/18 1015)  BP: 136/81 (03/17/18 1015)  SpO2: 99 % (03/17/18 0610) Vital Signs (24h Range):  Temp:  [96.1 °F (35.6 °C)-100.9 °F (38.3 °C)] 98.1 °F (36.7 °C)  Pulse:  [] 76  Resp:  [13-22] 16  SpO2:  [99 %-100 %] 99 %  BP: (112-160)/(58-96) 136/81     Weight: 84.4 kg (186 lb)  Body mass index is 30.02 kg/m².  Body surface area is 1.98 meters squared.      Intake/Output Summary (Last 24 hours) at 03/17/18 1102  Last data filed at 03/17/18 1000   Gross per 24 hour   Intake          5101.92 ml   Output             3675 ml   Net          1426.92 ml       Physical Exam   Constitutional: She is cooperative. She does not appear ill. No distress.   HENT:   Head: Head is without laceration, without right periorbital erythema and  without left periorbital erythema.   Nose: No epistaxis.   Mouth/Throat: Oropharynx is clear and moist. No oropharyngeal exudate. No tonsillar exudate.   Eyes: Conjunctivae are normal.   Neck: Neck supple. No thyroid mass and no thyromegaly present.   Cardiovascular: Normal rate and regular rhythm.  Exam reveals no friction rub.    Pulmonary/Chest: Breath sounds normal. No accessory muscle usage or stridor. No tachypnea. No respiratory distress. Chest wall is not dull to percussion. She exhibits no tenderness.   Abdominal: Soft. She exhibits no distension, no ascites and no mass. There is no hepatosplenomegaly.       Musculoskeletal: She exhibits no edema.   Lymphadenopathy:        Head (right side): No submental and no submandibular adenopathy present.        Head (left side): No submental and no submandibular adenopathy present.     She has no cervical adenopathy.     She has no axillary adenopathy.   Neurological: She is alert. She has normal strength. No cranial nerve deficit.   Skin: No bruising, no petechiae and no rash noted. She is not diaphoretic.   Psychiatric: Her behavior is normal. Thought content normal. Cognition and memory are normal. She does not exhibit a depressed mood.   Nursing note and vitals reviewed.      Significant Labs:   All pertinent labs from the last 24 hours have been reviewed.    Diagnostic Results:  I have reviewed all pertinent imaging results/findings within the past 24 hours.    Assessment/Plan:   1. Dysfunctional Uterine Bleeding  2. Symptomatic Anemia    Hb 6.2 g/dL.   Getting 2 units blood today.  No active bleeding clinically, abdominal exam benign.  Administer Amicar - 1 dose today for any potential internal bleeding.  D/w nursing staff   Check CBC in am    Garfield Seth MD  Hematology/Oncology  [facility]

## 2018-03-17 NOTE — CONSULTS
[facility] [place]  Hematology/Oncology  Consult Note    Patient Name: Nuria Franklin  MRN: [id number]  Admission Date: 3/14/2018  Hospital Length of Stay: 2 days  Code Status: Full Code   Attending Provider: Antonio Lew MD  Consulting Provider: Gilberto Roper MD  Primary Care Physician: Primary Doctor No  Principal Problem:Profuse vaginal bleeding    Consults  Subjective:     HPI:     Mrs. Nuria Franklin is a pleasant 42 y.o who underwent a laparoscopic hysterectomy about 2 weeks ago. She had post op bleeding requiring examination under anesthesia with suture ligation of bleeding points at vaginal cuff plus diagnostic laparoscopy. During the initial procedure she received 1 unit of prbc.    On 03/14 she presented to [facility] ED with vaginal bleeding. She was underwent exploratory laparotomy and repair of vaginal cuff-  ml. Next day, she began to have vaginal bleeding again and was taken to OR today for exam under anesthesia. Since procedure no bleeding reported. Pt and family states easy bruisability for a long time and had menorrhagia leading to elective hysterectomy. No family hx of bleeding or clotting disorders. Denies use of NSAIDs/ASA recently. Coagulation panel NL. Hx of DAMARIS positivity.      Oncology Treatment Plan:   [No treatment plan]    Medications:  Continuous Infusions:   sodium chloride 0.9% 125 mL/hr (03/14/18 1612)    sodium chloride 0.9% 125 mL/hr at 03/14/18 2233    sodium chloride 0.9%       Scheduled Meds:   aminocaproic acid (AMICAR) bolus 5 g  5 g Intravenous Once    aminocaproic acid (AMICAR) bolus 5 g  5 g Intravenous Once    cefTRIAXone (ROCEPHIN) IVPB  2 g Intravenous Q12H     PRN Meds:sodium chloride, acetaminophen, acetaminophen, diphenhydrAMINE, ibuprofen, ondansetron, oxyCODONE, oxyCODONE, oxyCODONE-acetaminophen, promethazine (PHENERGAN) IVPB, sodium chloride 0.9%, sodium chloride 0.9%     Review of patient's allergies indicates:  No Known  Allergies     Past Medical History:   Diagnosis Date    Anemia     Miscarriage     2013    Preeclampsia 10/6/2014     Past Surgical History:   Procedure Laterality Date    DILATION AND CURETTAGE OF UTERUS      HYSTERECTOMY       Family History     None        Social History Main Topics    Smoking status: Never Smoker    Smokeless tobacco: Never Used    Alcohol use No    Drug use: No    Sexual activity: Yes     Partners: Female     Birth control/ protection: None       Review of Systems     Constitutional: + fatigue   Eyes: no visual changes   ENT: no nasal congestion. Denies sore throat with odynophagia   Respiratory: No cough, SOB, exertional dyspnea or  hemoptysis   Cardiovascular: no chest pain or palpitations   Gastrointestinal: no nausea, vomiting or abdominal pain. Normal bowl habits   Hematologic/Lymphatic: see HPI   Musculoskeletal: No ROM abnormalities or muscle weakness   Neurological: no seizures or tremors, gait or balance prblems  Skin: No rashes or lesions  Psych: Denies any anxiety, depression or insomnia      Objective:     Vital Signs (Most Recent):  Temp: 97.9 °F (36.6 °C) (03/16/18 1535)  Pulse: 92 (03/16/18 1535)  Resp: 20 (03/16/18 1535)  BP: 132/73 (03/16/18 1535)  SpO2: 99 % (03/16/18 1200) Vital Signs (24h Range):  Temp:  [96.1 °F (35.6 °C)-98.8 °F (37.1 °C)] 97.9 °F (36.6 °C)  Pulse:  [] 92  Resp:  [13-22] 20  SpO2:  [99 %-100 %] 99 %  BP: (113-149)/(58-93) 132/73     Weight: 84.4 kg (186 lb)  Body mass index is 30.02 kg/m².  Body surface area is 1.98 meters squared.      Intake/Output Summary (Last 24 hours) at 03/16/18 1917  Last data filed at 03/16/18 1536   Gross per 24 hour   Intake             5744 ml   Output             3041 ml   Net             2703 ml       Physical Exam     General: NAD, resting well in the bed. Family at the bedside   Head: normocephalic, atraumatic   Eyes: conjunctivae slightly pale  Nose: Mucosa normal. No drainage or sinus tenderness, no  discharge   Throat: No erythema or post nasal discharge   Neck: supple, no LAD   Lungs: CTAB   Heart: S1, S2, RRR   Abdomen: + BS x 4 quadrants- surgical site stable- no bleeding   : Gonsalez to gravity   Extremities: no cyanosis or edema.   Skin: turgor normal, no erythema or rashes.   MS: LE strength 5/5   Psy: pleasant, affect is congruent to mood       Significant Labs:   CBC:   Recent Labs  Lab 03/15/18  1006 03/16/18  0620 03/16/18  1928   WBC 13.23* 8.88 18.68*   HGB 8.1* 7.0* 7.3*   HCT 26.0* 22.3* 23.1*    198 182   , CMP:   Recent Labs  Lab 03/15/18  0509 03/16/18  0620    141   K 4.5 3.8   * 111*   CO2 19* 25   * 98   BUN 11 7   CREATININE 0.8 0.8   CALCIUM 7.9* 8.6*   PROT 4.9* 5.6*   ALBUMIN 2.5* 2.7*   BILITOT 0.9 0.5   ALKPHOS 70 72   AST 21 17   ALT 25 19   ANIONGAP 8 5*   EGFRNONAA >60 >60   , Coagulation:   Recent Labs  Results for SAIMAUTEIA (MRN [id number]) as of 3/16/2018 21:50   Ref. Range 5/30/2016 12:02 3/14/2018 16:00 3/16/2018 06:20   Protime Latest Ref Range: 9.0 - 12.5 sec 10.8 10.2 10.7   Coumadin Monitoring INR Latest Ref Range: 0.8 - 1.2  1.0 1.0 1.0   aPTT Latest Ref Range: 21.0 - 32.0 sec 22.6 25.8 23.6   Fibrinogen Latest Ref Range: 182 - 366 mg/dL   312   D-Dimer Latest Ref Range: <0.50 mg/L FEU   3.94 (H)          Uric Acid No results for input(s): URICACID in the last 48 hours. and Urine Studies: No results for input(s): COLORU, APPEARANCEUA, PHUR, SPECGRAV, PROTEINUA, GLUCUA, KETONESU, BILIRUBINUA, OCCULTUA, NITRITE, UROBILINOGEN, LEUKOCYTESUR, RBCUA, WBCUA, BACTERIA, SQUAMEPITHEL, HYALINECASTS in the last 48 hours.    Invalid input(s): WRIGHTSUR    Diagnostic Results:      Postoperative changes consistent with recent hysterectomy with small amount of fluid likely reflecting postoperative seroma at the hysterectomy site.  There is small volume intraperitoneal air seen within the abdomen with additional subcutaneous air seen within the lower  anterior abdominal wall which is not unexpected in recent postoperative setting.  No significant hematoma seen.      Electronically signed by: Meghana Bregeron MD  Date: 03/15/2018  Time: 20:54      Assessment/Plan:     Active Diagnoses:    Diagnosis Date Noted POA      Problems Resolved During this Admission:    Diagnosis Date Noted Date Resolved POA    PRINCIPAL PROBLEM:  Profuse vaginal bleeding [N93.9] 03/14/2018 03/16/2018 Yes     Mrs. Adalberto Franklin, 43 YO lady with easy bruisability, menorrhagia and hx of + DAMARIS who underwent elective hysterectomy 2 weeks ago. Pt developed re-current post op bleeding and underwent expl lap. Pt found to have oozing from vaginal cuff region.      1. Vaginal bleeding: from surgical site   - pt received FFP on 03/14 and underwent expl lap   - pt examined under anesthesia today and no bleeding since then    - Amicar infusion in the event re-bleed from vaginal mucosa.    - repeat Hg shows stability   - hold off on blood products   - cbc in the am    - pt has NL coagulation panel and I do not suspect factor def.   - work up for easy bruising can be done out pt.     Communicated with RN and Dr. Antonio Lew.     Gilberto Roper MD  Hematology/Oncology  [facility] [place]

## 2018-03-17 NOTE — PLAN OF CARE
Problem: Patient Care Overview  Goal: Plan of Care Review  Pt progressing well. NAD noted. Pt ambulating and voiding at this time. 1st unit of PRBC for today infusing. Pt pain well controlled with Motrin and Percolone. Low transverse Aquacel dressing dry and intact. POC discussed with pt. Understanding verbalized.

## 2018-03-17 NOTE — OP NOTE
DATE OF PROCEDURE:  03/16/2018.    PREOPERATIVE DIAGNOSES:  1.  Persistent postoperative bleeding.  2.  Severe anemia.    POSTOPERATIVE DIAGNOSES:  1.  Persistent bleeding.  2.  Severe anemia.    SURGICAL PROCEDURE PERFORMED:  1.  Examination under anesthesia.  2.  Removal of all the sutures in the vaginal cuff and resuturing the entire   vaginal cuff.    SURGEON:  Antonio Lew M.D.    ASSISTANT:  Rashaad Bob M.D.    ESTIMATED BLOOD LOSS:  Less than 50 mL.    COMPLICATIONS:  None.    DETAILS:  Ms. Adalberto Franklin is a 42-year-old woman who had her first surgery   March 2nd, which was a total laparoscopic hysterectomy with bilateral   salpingectomy.  Later on in the evening of 03/02/2018, she was noted to be   having vaginal bleeding and she was taken back to the Operating Room where her   vaginal cuff was resutured.  The patient went home, returned to the hospital on   March 14th and at this time, she was pouring blood out of her vagina with   estimated blood loss in excess of 800 cc.  She underwent emergency surgery at that   time, which included a vaginal examination and followed by an open laparotomy   and repair of a pumper in the vaginal cuff.  The patient did well for 24 hours   and then all of a sudden she started having bleeding again overnight on   03/15/2018.  The bleeding started off very slowly, but eventually the patient   was soaking a pad every 20-30 minutes.  She had also bled down to a hemoglobin   of 7.  A  request for 2 units of packed RBCs which was   started before she went to the Operating Room and  4 bags of fresh frozen plasma.  The   patient was taken to the Operating Room where examination under anesthesia, with   the patient in the dorsal lithotomy position showed approximately 150 mL of   clots in the vagina, which were extracted.  The vagina was subsequently prepped   and draped in the usual sterile fashion.  A timeout was held.  The patient was   identified by name, date of birth  and procedures to be performed.  The   examination under anesthesia was then continued.  There was no blood that we   could see pouring out of the vagina.  Consequently, the stitches inside the   vaginal cuff were cut and then extracted.  Eventually, several moderate-sized   clots were allowed to escape from the vaginal cuff.  We did not notice any   pumpers, just generalized oozing.  All the sutures from the vaginal cuff having   been removed, closure of the vagina was done from left to right using 0 Vicryl   in a running interlocking fashion.  Following this, the Bovie was used to   control several small areas that appeared to still be oozing.  At the end of the   procedure, the vaginal cuff was completely dry and intact and the procedure was   subsequently terminated and the patient was transferred to Recovery Room in   good condition.  I called the patient's daughter Christine on her cell phone,   informed her that the surgery had been done and her mother had done well.  In   the interim, I had contacted the hematologist for evaluation since it is my   impression that the patient has some type of coagulopathy since the bleedings   that she had after each surgery was inconsistent with the lesions that we found   at surgery.      LUIS FERNANDO/MARIA LUZ  dd: 03/16/2018 14:30:59 (CDT)  td: 03/17/2018 09:49:40 (CDT)  Doc ID   #[id number]  Job ID #[id number]    CC: Rashaad Bob M.D.

## 2018-03-17 NOTE — PROGRESS NOTES
[facility]  Obstetrics & Gynecology  Progress Note    Patient Name: Nuria Franklin  MRN: [id number]  Admission Date: 3/14/2018  Primary Care Provider: Primary Doctor No  Principal Problem: Postoperative vaginal bleeding following genitourinary procedure    Subjective:     HPI:  No notes on file    Interval History: HD #3 from return to OR on 3/14 and 3/16    Scheduled Meds:   cefTRIAXone (ROCEPHIN) IVPB  2 g Intravenous Q12H     Continuous Infusions:   sodium chloride 0.9% 125 mL/hr at 03/17/18 1250     PRN Meds:sodium chloride, acetaminophen, aminocaproic acid (AMICAR) bolus 5 g, ibuprofen, oxyCODONE, oxyCODONE, promethazine (PHENERGAN) IVPB, sodium chloride 0.9%    Review of patient's allergies indicates:  No Known Allergies    Objective:     Vital Signs (Most Recent):  Temp: 98.2 °F (36.8 °C) (03/17/18 1600)  Pulse: 94 (03/17/18 1600)  Resp: 20 (03/17/18 1600)  BP: (!) 141/88 (03/17/18 1600)  SpO2: 99 % (03/17/18 0610) Vital Signs (24h Range):  Temp:  [97.4 °F (36.3 °C)-100.9 °F (38.3 °C)] 98.2 °F (36.8 °C)  Pulse:  [] 94  Resp:  [16-22] 20  SpO2:  [99 %] 99 %  BP: (112-172)/(66-96) 141/88     Weight: 84.4 kg (186 lb)  Body mass index is 30.02 kg/m².  Patient's last menstrual period was 02/17/2018.    I&O (Last 24H):    Intake/Output Summary (Last 24 hours) at 03/17/18 1651  Last data filed at 03/17/18 1632   Gross per 24 hour   Intake          2440.92 ml   Output             2150 ml   Net           290.92 ml       Physical Exam:   Constitutional: She is oriented to person, place, and time. She appears well-developed and well-nourished. No distress.    HENT:   Head: Normocephalic and atraumatic.     Neck: Normal range of motion. No thyromegaly present.    Cardiovascular: Normal rate.     Pulmonary/Chest: Effort normal. No respiratory distress.        Abdominal: Soft. She exhibits no distension and no mass. There is no tenderness. There is no rebound and no guarding.   Wound CDI              Musculoskeletal: Normal range of motion and moves all extremeties.       Neurological: She is alert and oriented to person, place, and time. No cranial nerve deficit. Coordination normal.    Skin: She is not diaphoretic.    Psychiatric: She has a normal mood and affect. Her behavior is normal. Judgment and thought content normal.       Laboratory:  CBC:   Recent Labs  Lab 03/17/18  0445   WBC 12.06   RBC 2.33*   HGB 6.2*   HCT 19.2*   *   MCV 82   MCH 26.6*   MCHC 32.3     Pt received 2 U PRBC's after this result.      Pt seen by Heme/Onc who do not feel any further meds are indicated at this time.    Assessment/Plan:     * Postoperative vaginal bleeding following genitourinary procedure    Pt no longer having vaginal bleeding after cuff was opened and re-closed.        Symptomatic anemia    Pt completed 2 U PRBC's, recheck CBC in am            Rashaad Bob MD  Obstetrics & Gynecology  [facility]

## 2018-03-17 NOTE — PROGRESS NOTES
Notified Dr. Lew of temperature of 100.9 and Hemoglobin and Hematocrit of 7.3/23.1. Orders to give Tylenol at this time received. Dr. Lew would also like Hematology notified of patient's Hemoglobin and Hematocrit and history.  notified of CBC results and history reviewed. Orders to discontinue Amicar and to hold off giving more blood products received. Dr. Roper states that he will follow up outpatient for the patient's easy bruising.

## 2018-03-17 NOTE — SUBJECTIVE & OBJECTIVE
Interval History: HD #3 from return to OR on 3/14 and 3/16    Scheduled Meds:   cefTRIAXone (ROCEPHIN) IVPB  2 g Intravenous Q12H     Continuous Infusions:   sodium chloride 0.9% 125 mL/hr at 03/17/18 1250     PRN Meds:sodium chloride, acetaminophen, aminocaproic acid (AMICAR) bolus 5 g, ibuprofen, oxyCODONE, oxyCODONE, promethazine (PHENERGAN) IVPB, sodium chloride 0.9%    Review of patient's allergies indicates:  No Known Allergies    Objective:     Vital Signs (Most Recent):  Temp: 98.2 °F (36.8 °C) (03/17/18 1600)  Pulse: 94 (03/17/18 1600)  Resp: 20 (03/17/18 1600)  BP: (!) 141/88 (03/17/18 1600)  SpO2: 99 % (03/17/18 0610) Vital Signs (24h Range):  Temp:  [97.4 °F (36.3 °C)-100.9 °F (38.3 °C)] 98.2 °F (36.8 °C)  Pulse:  [] 94  Resp:  [16-22] 20  SpO2:  [99 %] 99 %  BP: (112-172)/(66-96) 141/88     Weight: 84.4 kg (186 lb)  Body mass index is 30.02 kg/m².  Patient's last menstrual period was 02/17/2018.    I&O (Last 24H):    Intake/Output Summary (Last 24 hours) at 03/17/18 1651  Last data filed at 03/17/18 1632   Gross per 24 hour   Intake          2440.92 ml   Output             2150 ml   Net           290.92 ml       Physical Exam:   Constitutional: She is oriented to person, place, and time. She appears well-developed and well-nourished. No distress.    HENT:   Head: Normocephalic and atraumatic.     Neck: Normal range of motion. No thyromegaly present.    Cardiovascular: Normal rate.     Pulmonary/Chest: Effort normal. No respiratory distress.        Abdominal: Soft. She exhibits no distension and no mass. There is no tenderness. There is no rebound and no guarding.   Wound CDI             Musculoskeletal: Normal range of motion and moves all extremeties.       Neurological: She is alert and oriented to person, place, and time. No cranial nerve deficit. Coordination normal.    Skin: She is not diaphoretic.    Psychiatric: She has a normal mood and affect. Her behavior is normal. Judgment and  thought content normal.       Laboratory:  CBC:   Recent Labs  Lab 03/17/18  0445   WBC 12.06   RBC 2.33*   HGB 6.2*   HCT 19.2*   *   MCV 82   MCH 26.6*   MCHC 32.3     Pt received 2 U PRBC's after this result.      Pt seen by Heme/Onc who do not feel any further meds are indicated at this time.

## 2018-03-17 NOTE — SUBJECTIVE & OBJECTIVE
Interval History: Getting PRBC - 1st unit infusing    Oncology Treatment Plan:   [No treatment plan]    Medications:  Continuous Infusions:   sodium chloride 0.9% 125 mL/hr at 03/16/18 2239     Scheduled Meds:   cefTRIAXone (ROCEPHIN) IVPB  2 g Intravenous Q12H     PRN Meds:sodium chloride, acetaminophen, aminocaproic acid (AMICAR) bolus 5 g, ibuprofen, oxyCODONE, oxyCODONE, promethazine (PHENERGAN) IVPB, sodium chloride 0.9%     Review of Systems   HENT: Negative for mouth sores and nosebleeds.    Gastrointestinal: Negative for abdominal distention and abdominal pain.   Genitourinary: Negative for pelvic pain and vaginal bleeding.   Neurological: Negative for seizures and headaches.   Hematological: Negative for adenopathy.   Psychiatric/Behavioral: Negative for behavioral problems and confusion. The patient is nervous/anxious.      Objective:     Vital Signs (Most Recent):  Temp: 98.1 °F (36.7 °C) (03/17/18 1015)  Pulse: 76 (03/17/18 1015)  Resp: 16 (03/17/18 1015)  BP: 136/81 (03/17/18 1015)  SpO2: 99 % (03/17/18 0610) Vital Signs (24h Range):  Temp:  [96.1 °F (35.6 °C)-100.9 °F (38.3 °C)] 98.1 °F (36.7 °C)  Pulse:  [] 76  Resp:  [13-22] 16  SpO2:  [99 %-100 %] 99 %  BP: (112-160)/(58-96) 136/81     Weight: 84.4 kg (186 lb)  Body mass index is 30.02 kg/m².  Body surface area is 1.98 meters squared.      Intake/Output Summary (Last 24 hours) at 03/17/18 1102  Last data filed at 03/17/18 1000   Gross per 24 hour   Intake          5101.92 ml   Output             3675 ml   Net          1426.92 ml       Physical Exam   Constitutional: She is cooperative. She does not appear ill. No distress.   HENT:   Head: Head is without laceration, without right periorbital erythema and without left periorbital erythema.   Nose: No epistaxis.   Mouth/Throat: Oropharynx is clear and moist. No oropharyngeal exudate. No tonsillar exudate.   Eyes: Conjunctivae are normal.   Neck: Neck supple. No thyroid mass and no thyromegaly  present.   Cardiovascular: Normal rate and regular rhythm.  Exam reveals no friction rub.    Pulmonary/Chest: Breath sounds normal. No accessory muscle usage or stridor. No tachypnea. No respiratory distress. Chest wall is not dull to percussion. She exhibits no tenderness.   Abdominal: Soft. She exhibits no distension, no ascites and no mass. There is no hepatosplenomegaly.       Musculoskeletal: She exhibits no edema.   Lymphadenopathy:        Head (right side): No submental and no submandibular adenopathy present.        Head (left side): No submental and no submandibular adenopathy present.     She has no cervical adenopathy.     She has no axillary adenopathy.   Neurological: She is alert. She has normal strength. No cranial nerve deficit.   Skin: No bruising, no petechiae and no rash noted. She is not diaphoretic.   Psychiatric: Her behavior is normal. Thought content normal. Cognition and memory are normal. She does not exhibit a depressed mood.   Nursing note and vitals reviewed.      Significant Labs:   All pertinent labs from the last 24 hours have been reviewed.    Diagnostic Results:  I have reviewed all pertinent imaging results/findings within the past 24 hours.

## 2018-03-18 VITALS
TEMPERATURE: 98 F | HEIGHT: 66 IN | WEIGHT: 186 LBS | DIASTOLIC BLOOD PRESSURE: 82 MMHG | HEART RATE: 69 BPM | BODY MASS INDEX: 29.89 KG/M2 | RESPIRATION RATE: 20 BRPM | OXYGEN SATURATION: 99 % | SYSTOLIC BLOOD PRESSURE: 145 MMHG

## 2018-03-18 LAB
BASOPHILS # BLD AUTO: 0.04 K/UL
BASOPHILS NFR BLD: 0.4 %
DIFFERENTIAL METHOD: ABNORMAL
EOSINOPHIL # BLD AUTO: 0.3 K/UL
EOSINOPHIL NFR BLD: 3.1 %
ERYTHROCYTE [DISTWIDTH] IN BLOOD BY AUTOMATED COUNT: 16.8 %
HCT VFR BLD AUTO: 28.5 %
HGB BLD-MCNC: 9.3 G/DL
LYMPHOCYTES # BLD AUTO: 1.6 K/UL
LYMPHOCYTES NFR BLD: 17.4 %
MCH RBC QN AUTO: 27.3 PG
MCHC RBC AUTO-ENTMCNC: 32.6 G/DL
MCV RBC AUTO: 84 FL
MONOCYTES # BLD AUTO: 0.7 K/UL
MONOCYTES NFR BLD: 7.8 %
NEUTROPHILS # BLD AUTO: 6.6 K/UL
NEUTROPHILS NFR BLD: 71.3 %
PLATELET # BLD AUTO: 144 K/UL
PMV BLD AUTO: 9.8 FL
RBC # BLD AUTO: 3.41 M/UL
WBC # BLD AUTO: 9.26 K/UL

## 2018-03-18 PROCEDURE — 99238 HOSP IP/OBS DSCHRG MGMT 30/<: CPT | Mod: ,,, | Performed by: OBSTETRICS & GYNECOLOGY

## 2018-03-18 PROCEDURE — 85246 CLOT FACTOR VIII VW ANTIGEN: CPT

## 2018-03-18 PROCEDURE — 63600175 PHARM REV CODE 636 W HCPCS: Performed by: OBSTETRICS & GYNECOLOGY

## 2018-03-18 PROCEDURE — 85247 CLOT FACTOR VIII MULTIMETRIC: CPT

## 2018-03-18 PROCEDURE — 85397 CLOTTING FUNCT ACTIVITY: CPT

## 2018-03-18 PROCEDURE — 85025 COMPLETE CBC W/AUTO DIFF WBC: CPT

## 2018-03-18 PROCEDURE — 36415 COLL VENOUS BLD VENIPUNCTURE: CPT

## 2018-03-18 RX ADMIN — CEFTRIAXONE 2 G: 2 INJECTION, SOLUTION INTRAVENOUS at 01:03

## 2018-03-18 NOTE — PROGRESS NOTES
[facility]  Obstetrics & Gynecology  Progress Note    Patient Name: Nuria Franklin  MRN: [id number]  Admission Date: 3/14/2018  Primary Care Provider: Primary Doctor No  Principal Problem: Postoperative vaginal bleeding following genitourinary procedure    Subjective:     HPI:  No notes on file    Interval History: POD #4 s/p exploratory laparotomy and POD #2 s/p closure of vaginal cuff     Scheduled Meds:   cefTRIAXone (ROCEPHIN) IVPB  2 g Intravenous Q12H     Continuous Infusions:   sodium chloride 0.9% 125 mL/hr at 03/17/18 1250     PRN Meds:sodium chloride, acetaminophen, aminocaproic acid (AMICAR) bolus 5 g, ibuprofen, oxyCODONE, oxyCODONE, promethazine (PHENERGAN) IVPB, sodium chloride 0.9%    Review of patient's allergies indicates:  No Known Allergies    Objective:     Vital Signs (Most Recent):  Temp: 97.8 °F (36.6 °C) (03/18/18 0400)  Pulse: 69 (03/18/18 0400)  Resp: 20 (03/18/18 0400)  BP: (!) 145/82 (03/18/18 0400)  SpO2: 99 % (03/17/18 0610) Vital Signs (24h Range):  Temp:  [97.8 °F (36.6 °C)-98.9 °F (37.2 °C)] 97.8 °F (36.6 °C)  Pulse:  [69-86] 69  Resp:  [20] 20  BP: (137-152)/(81-89) 145/82     Weight: 84.4 kg (186 lb)  Body mass index is 30.02 kg/m².  Patient's last menstrual period was 02/17/2018.    I&O (Last 24H):    Intake/Output Summary (Last 24 hours) at 03/18/18 1608  Last data filed at 03/17/18 1632   Gross per 24 hour   Intake              520 ml   Output                0 ml   Net              520 ml       Physical Exam:   Constitutional: She is oriented to person, place, and time. She appears well-developed and well-nourished.    HENT:   Head: Normocephalic and atraumatic.     Neck: Normal range of motion.    Cardiovascular: Normal rate.     Pulmonary/Chest: Effort normal. No respiratory distress.        Abdominal: Soft. She exhibits no distension and no mass. There is no tenderness. There is no rebound and no guarding.   Wound CDI             Musculoskeletal:  Normal range of motion and moves all extremeties.       Neurological: She is alert and oriented to person, place, and time. No cranial nerve deficit. Coordination normal.     Psychiatric: She has a normal mood and affect. Her behavior is normal. Judgment and thought content normal.       Laboratory:  CBC:   Recent Labs  Lab 03/18/18  0545   WBC 9.26   RBC 3.41*   HGB 9.3*   HCT 28.5*   *   MCV 84   MCH 27.3   MCHC 32.6     Assessment/Plan:     * Postoperative vaginal bleeding following genitourinary procedure    Pt no longer having vaginal bleeding after cuff was opened and re-closed.  Pt desired d/c on 3/18/18        Symptomatic anemia    Pt completed 2 U PRBC's, recheck CBC in am        CBC stable    Rashaad Bob MD  Obstetrics & Gynecology  [facility]

## 2018-03-18 NOTE — ASSESSMENT & PLAN NOTE
Pt no longer having vaginal bleeding after cuff was opened and re-closed.  Pt desired d/c on 3/18/18

## 2018-03-18 NOTE — PLAN OF CARE
Problem: Patient Care Overview  Goal: Plan of Care Review  Outcome: Ongoing (interventions implemented as appropriate)  Pt has no complaints. Doing well with pain control. VSS

## 2018-03-18 NOTE — PROGRESS NOTES
Patient and her daughters report that she started having easy bruising about 10 yrs ago along with heavy menstrual periods resulting in chronic anemia. The slightest squeeze causes a hematoma that lasts for weeks before it resolves.Dr Cervantes started her on iron therapy then but as far as she knows she never saw a hematologist or received a complete anemia workup. She had menses that lasted as long as 2 months at a time and got Mirena in an attempt to control her menorrhagia. She kept Mirena for over a year during which time she bled every day and eventually she opted for hysterectomy for her AUB. She had laparoscopic PETTY with BS on the morning of 03/02/2018 and later that evening was taken back to the OR for continued vaginal bleeding. At that time she had reinforcement of her vaginal cuff and repeat laparoscopic evaluation which showed there was no blood in her abdomen. Patient was discharged home in good condition and was doing well when on 03/14/2018 she began to have spotting from her vagina which got progressively heavier..By the time she came to the ED she was bleeding heavily and passing lots of large clots. She estimates she had passed 100-200 cc of clots at home. At  the time I saw her in the ED. while evaluating her I noticed that she passed blood and clots in her bed pan of about 600 ccs.for a total blood loss of as much as 800 cc. She had tachycardia in the 145 range, her pressure had dropped and she was laboring to breath. Her H/H  9.1/29.7 on arrival in ED before her blood loss or IV equilibration. She was prepared for and underwent surgery by me. Despite passing all those clots, her vaginal cuff did not show any significant bleeding so in an abundance of caution I did an exploratory laparotomy assuming that that she had intraabdominal bleeding. At laparotomy I noticed that almost every pedicle was wet with blood and she had a pumper in right lateral vaginal wall which we coagualted along with  "coagulation of her oozing pedicles. We also reclosed the entire cuff. At completion of this surgery she was completely dry. She had received 2 units of pRBCs. About 24 hours after surgery the nurses noted that she had started bleeding again slowly at first and then getting progressively heavier. and by the next morning she was soaking a pad every 20-30 minutes and they estimate she lost an additional  several hundred ccs of blood. IR was consulted but bleeding was so impressive that I decided to again take her back to surgery and requested another seasoned gynecologist ,Dr Rashaad Bob, assist me. It occurred to me at this time that her bleeding was probably "Medical" and I called and spoke with the hematologist. At this surgery we again noted her vulva was caked with blood clots but there was no blood pouring out of her vagina. The entire cuff was again reopened and then resutured. There was less than   50 cc blood loss and a few small clots but no good explanation of the blood that was pouring out before we started giving her RBCs and FFP. Based on her history, I am now convinced that she has some type of coagulopathy and that her recurrent bleeding was not surgical bleeding but secondary medical bleeding which has so far responded to FFP, antibiotic therapy and the Amicar she is receiving. We will ask the hematologist to assess her for acquired coagulopathy.  "

## 2018-03-18 NOTE — SUBJECTIVE & OBJECTIVE
Interval History: POD #4 s/p exploratory laparotomy and POD #2 s/p closure of vaginal cuff     Scheduled Meds:   cefTRIAXone (ROCEPHIN) IVPB  2 g Intravenous Q12H     Continuous Infusions:   sodium chloride 0.9% 125 mL/hr at 03/17/18 1250     PRN Meds:sodium chloride, acetaminophen, aminocaproic acid (AMICAR) bolus 5 g, ibuprofen, oxyCODONE, oxyCODONE, promethazine (PHENERGAN) IVPB, sodium chloride 0.9%    Review of patient's allergies indicates:  No Known Allergies    Objective:     Vital Signs (Most Recent):  Temp: 97.8 °F (36.6 °C) (03/18/18 0400)  Pulse: 69 (03/18/18 0400)  Resp: 20 (03/18/18 0400)  BP: (!) 145/82 (03/18/18 0400)  SpO2: 99 % (03/17/18 0610) Vital Signs (24h Range):  Temp:  [97.8 °F (36.6 °C)-98.9 °F (37.2 °C)] 97.8 °F (36.6 °C)  Pulse:  [69-86] 69  Resp:  [20] 20  BP: (137-152)/(81-89) 145/82     Weight: 84.4 kg (186 lb)  Body mass index is 30.02 kg/m².  Patient's last menstrual period was 02/17/2018.    I&O (Last 24H):    Intake/Output Summary (Last 24 hours) at 03/18/18 1608  Last data filed at 03/17/18 1632   Gross per 24 hour   Intake              520 ml   Output                0 ml   Net              520 ml       Physical Exam:   Constitutional: She is oriented to person, place, and time. She appears well-developed and well-nourished.    HENT:   Head: Normocephalic and atraumatic.     Neck: Normal range of motion.    Cardiovascular: Normal rate.     Pulmonary/Chest: Effort normal. No respiratory distress.        Abdominal: Soft. She exhibits no distension and no mass. There is no tenderness. There is no rebound and no guarding.   Wound CDI             Musculoskeletal: Normal range of motion and moves all extremeties.       Neurological: She is alert and oriented to person, place, and time. No cranial nerve deficit. Coordination normal.     Psychiatric: She has a normal mood and affect. Her behavior is normal. Judgment and thought content normal.       Laboratory:  CBC:   Recent Labs  Lab  03/18/18  0545   WBC 9.26   RBC 3.41*   HGB 9.3*   HCT 28.5*   *   MCV 84   MCH 27.3   MCHC 32.6

## 2018-03-18 NOTE — PLAN OF CARE
Problem: Patient Care Overview  Goal: Plan of Care Review  Outcome: Ongoing (interventions implemented as appropriate)  Pain being controlled with PRN percocet. Tolerating regular diet. Ambulating independently, pt. denies any lightheadedness or dizziness. POC reviewed with pt.

## 2018-03-20 LAB
VWF AG ACT/NOR PPP IA: 114 %
VWF:AC ACT/NOR PPP IA: 83 %

## 2018-03-23 ENCOUNTER — OFFICE VISIT (OUTPATIENT)
Dept: OBSTETRICS AND GYNECOLOGY | Facility: CLINIC | Age: 43
End: 2018-03-23
Payer: COMMERCIAL

## 2018-03-23 VITALS
BODY MASS INDEX: 29.05 KG/M2 | WEIGHT: 180.75 LBS | SYSTOLIC BLOOD PRESSURE: 120 MMHG | HEIGHT: 66 IN | DIASTOLIC BLOOD PRESSURE: 82 MMHG

## 2018-03-23 DIAGNOSIS — Z87.898 HISTORY OF BRUISING EASILY: ICD-10-CM

## 2018-03-23 DIAGNOSIS — R23.3 EASY BRUISING: ICD-10-CM

## 2018-03-23 DIAGNOSIS — Z98.890 S/P EXPLORATORY LAPAROTOMY: ICD-10-CM

## 2018-03-23 DIAGNOSIS — Z90.710 S/P LAPAROSCOPIC HYSTERECTOMY: ICD-10-CM

## 2018-03-23 DIAGNOSIS — Z09 POSTOP CHECK: Primary | ICD-10-CM

## 2018-03-23 PROCEDURE — 99999 PR PBB SHADOW E&M-EST. PATIENT-LVL III: CPT | Mod: PBBFAC,,, | Performed by: OBSTETRICS & GYNECOLOGY

## 2018-03-23 PROCEDURE — 99499 UNLISTED E&M SERVICE: CPT | Mod: S$GLB,,, | Performed by: OBSTETRICS & GYNECOLOGY

## 2018-03-26 NOTE — PROGRESS NOTES
[facility]  Ambulatory Clinic  Obstetrics & Gynecology    Visit Date:  3/23/2018    Chief Complaint:  Post-op visit    Subjective:      Nuria Franklin is a 42 y.o.  here for post-op visit.    Pt is s/p total laparoscopic hysterectomy and bilateral salpingectomy on 3/2/18 for abnormal uterine bleeding and h/o anemia requiring blood transfusion.  After her TLH, pt developed vaginal bleeding and underwent examination under anesthesia, suture ligation of bleeding points at vaginal cuff, and diagnostic laparoscopy on 3/2/18.  Pt had no new vaginal bleeding during this hospital admission and was discharged in stable condition.    Pt was recovering well since her hospital discharge on 3/2/18 up until 3/14/18 when she again developed vaginal bleeding  Pt was admitted and underwent an exploratory laparotomy and repair of vaginal cuff by Dr. Lew who was on-call on 3/14.  After the ex lap on 3/14, pt again had developed vaginal bleeding and was taken back to OR on 3/16 for examination under anesthesia and repair of the vaginal cuff.  Hematology (Dr. Roper) was consulted this hospital stay for possible clotting deficiency, work-up for Von Willebrand so far negative, heme does not suspect clotting deficiency at this time.    Pt states she is doing well since her surgery and has no major complaints today.    Pt denies any vaginal bleeding or discharge, pain, incisional problems, GI/ complaint, easy bleeding/bruising.    Older daughter present for visit, pt/mother declined [employer]  services.    Review of Systems:      Constitutional:  No fever, fatigue  HENT:  No congestion, hearing changes  Eyes:  No visual disturbance  Respiratory:  No cough, shortness of breath  Cardiovascular:  No chest pain, leg swelling  Gastrointestinal:  No abdominal pain, constipation, blood in stool   Genitourinary:  No dysuria, frequency  Skin:  No rash, jaundice  Neurological:  No dizziness,  "weakness, headaches    Objective:     /82 (BP Location: Left arm, Patient Position: Sitting, BP Method: Large (Manual))   Ht 5' 6" (1.676 m)   Wt 82 kg (180 lb 12.4 oz)   LMP 2018   BMI 29.18 kg/m²    Pulse 70, Resp rate 16     GENERAL:  NAD, well-nourished  HEENT:  NCAT, moist mucus membranes. Neck supple w/o masses.  LUNGS:  CTA-B  HEART:  RRR, no murmurs, gallops, or rubs  ABDOMEN:  Soft, non-tender, non-distended. Normoactive BS. No obvious organomegaly.  Pfannenstiel incision and laparoscopic incisions are clean, dry, and intact.  Healing appropriately without signs of infection.    EXT:  Symmetric w/o cramping, claudication, or edema. +2 distal pulses.  SKIN:  No rashes or bruising  NEURO:  Grossly intact bilaterally  PSYCH:  Mood and affect appropriate    PELVIC:  Female external genitalia w/o any obvious lesions. Normal urethral meatus. No gross lymphadenopathy.   VAGINA:  Pink, moist, well-rugated. Adequate support. Vaginal cuff intact, non-tender with no active bleeding or discharge.   CERVIX:   Surgically absent.   UTERUS:  Surgically absent.   ADNEXA:  No masses, non-tender   RECTAL:  Declined. No obvious external lesions    Chaperone present for exam.    Laboratory:    Lab Results   Component Value Date    WBC 9.26 2018    HGB 9.3 (L) 2018    HCT 28.5 (L) 2018    MCV 84 2018     (L) 2018     Sugical pathology:    FINAL PATHOLOGIC DIAGNOSIS  Uterus and cervix (140 g):  -Adenomyosis  -Early proliferative endometrium. Glands are synchronous and free of hyperplasia and atypia.  -Squamous metaplasia, chronic inflammation, and Nabothian cyst formation of the cervix  Fallopian tubes (2):  -No significant histopathologic abnormalities    Surgical pathology reviewed with pt.    Assessment:    1. 42 y.o.  s/p total laparoscopic hysterectomy 3/2/18, ex-lap 3/14/18 and examination under anesthesia with repair of vaginal cuff 3/16 for post-op vaginal " bleeding  2. Anemia, asymptomatic    Plan:    Post-op care instructions and precautions reviewed.  Surgical findings and pathology discussed with pt.  Pelvic rest x 6 wks post-op.  Iron supplementation with fergon, colace.  Anemia precautions. Motrin and percocet prn pain.    F/u with hematology Dr. Gilberto Roper for easy bruising/?clotting deficiency?.  Bleeding precautions.     Return in 4 weeks, or sooner for any concerns.  All of her questions were answered to her satisfactions, pt voiced understanding.       Aamir Lopez MD

## 2018-03-28 NOTE — PHYSICIAN QUERY
"PT Name: Nuria Franklin  MR #: [id number]    Physician Query Form - Hematology Clarification      CDS/: MINOR [email or web address]          Contact information:[email or web address]    This form is a permanent document in the medical record.      Query Date: March 28, 2018    By submitting this query, we are merely seeking further clarification of documentation. Please utilize your independent clinical judgment when addressing the question(s) below.    The Medical record contains the following:   Indicators  Supporting Clinical Findings Location in Medical Record   X "Anemia" documented POSTOPERATIVE DIAGNOSES:  1.  Persistent bleeding.  2.  Severe anemia. Op note 3/18@113am   X H & H = Hgb=6.2-9.3  Hct=19.2-28.5 LAB 3/15-3/18    BP =                     HR=      "GI bleeding" documented     X Acute bleeding (Non GI site) patient started bleeding again last night and now appears to be bleeding at vigorous rate. Her H/H dropped again overnight and she is now soaking a pad every 20 minutes or so. OB Progress note 3/16@928am   X Transfusion(s) Pt completed 2 U PRBC's, recheck CBC in am OB Progress note 3/18@413pm    Treatment:     X Other:  Postoperative vaginal bleeding following genitourinary procedure OB Progress note 3/18@413pm     Provider, please specify diagnosis or diagnoses associated with above clinical findings.    [x  ] Acute blood loss anemia  [  ] Other Hematological Diagnosis (please specify): _________________________________  [  ] Clinically Undetermined       Please document in your progress notes daily for the duration of treatment, until resolved, and include in your discharge summary.                                                                                                      "

## 2018-03-29 LAB — VWF MULTIMERS PPP QL: NORMAL

## 2018-04-07 NOTE — DISCHARGE SUMMARY
DATE OF ADMISSION:  03/14/2018    DATE OF DISCHARGE:  03/18/2018    FINAL DIAGNOSES:  1.  Severe postoperative bleeding.  2.  Suspected coagulopathy.  3.  Severe anemia requiring blood transfusions.    ADMITTING PHYSICIAN:  Antonio Lew M.D.    DETAILS:  Ms. Nuria Franklin is a 42-year-old female originally from   [facility] who was seen initially in the Emergency Room with severe vaginal bleeding.    The patient had laparoscopic total abdominal hysterectomy on 03/02/2018.    Several hours after surgery, the patient was noted to be bleeding and she was   taken back to the Operating Room by Dr. Aamir Lopez, who had done her laparoscopy   and he reinforced the vaginal cuff.  At the time I saw the patient in the   Emergency Room, she was pouring out blood.  She had lost in excess of 150 mL of   blood at home and in the bucket in the ER, she had another 600 mL.  Because she   was bleeding so much, she was taken to the Operating Room for what was assumed   to be postoperative bleeding.  At surgery, we looked at the vaginal cuff via the vagina and   there was minimal to no oozing from that area.  Consequently, an   exploratory laparotomy was performed and we noted that all the pedicles inside   were oozing.  She also had a pumper coming in the right side of the vaginal cuff.  The   pumper was controlled and the entire vaginal cuff was reinforced.  The patient   went to her room after receiving 2 units of packed RBCs.  The patient went for   in excess of 24 hours without bleeding and then on 03/16/2018, she again began   to bleed heavily and again was saturating pads every 20  to 30 minutes.    She was again taken to the Operating Room where the vaginal cuff was reinforced.    At this time; however, in addition to giving her 2 units of packed RBCs, we   also gave her 2 units of fresh frozen plasma and a consult was placed to Dr. Gilberto Roper Hematology/Oncology and he recommended Amicar a bolus of 5 g and then    5 g IV for the patient.  The patient as noted was taken to the Operating Room   again where the vaginal cuff was again reinforced.  Subsequent to this, she   remained stable.  Her blood count did not change significantly and we noted in   her lab report that on 03/14/2018 after receiving 2 units of blood,  she had a   hemoglobin of 7.6 and a hematocrit of 24.3.  A 5 o'clock the next morning she   had a hemoglobin of 6.3 and 20.2.  At 10 o'clock hemoglobin was 8.1 and 26.8 and   on the morning of 03/16/2018, after she has started bleeding overnight, her   initial CBC before to go to the hospital showed a hemoglobin of 7 and the   hematocrit of 22.3, but after receiving 2 units of blood, her hemoglobin was 7.3   and 23.1 and on 03/17/2018, she had a hemoglobin of 6.2, and a hematocrit of   19.2 at which point she received additional blood and fresh frozen plasma.  At   the time of discharge,  she had a hemoglobin of 9.3, and hematocrit of 28.5 and   this had been stable for in excess of 24 hours.  While she was here in the   hospital, we attempted to do a workup for bleeding disorder.  Her PT on   03/14/2018 was 10.2 and 03/16/2018 was 10.7.  Her PT/PTT was 12 and 25.8 on   03/14/2018 and PTT 23.6 on 03/16/2018.  Her fibrinogen was 312, which is normal.    Her D-dimer was 3.94, which is slightly elevated.  Her von Willebrand antigen   was 114, normal being , von Willebrand factor 83 where normal is ,   so at the time she was discharged, we did not have any good explanation for her   bleeding.  Except that the patient's family indicated that starting about 10   years ago, the patient began to have problems with bleeding. She bruises   very easily and it does not take much, even squeezing her hand will cause a   bruise that will take several days to resolve.  So it is our impression that the   patient has a coagulopathy and she was recommended to follow up with hematology   oncologist as an outpatient to see  if they can identify what is going on.  She   was subsequently discharged home on light duty, pelvic rest, regular diet and to   return to clinic in one week.    MEDICATIONS AT DISCHARGE:  Included resumption of her Colace and ferrous sulfate   tablets, Motrin 600 mg q. 6 hours and Percocet 5/325 q. 6 hours p.r.n. pain.    The patient has specific instructions to return to this facility for questions,   concerns or complications.  She is to follow up with either myself in one week   or to Dr. Lopez in the same period of time and her daughters have been made   aware that if she starts bleeding again that she needs to come in to the ED and that she   probably will need fresh frozen plasma since our suspicion is that    some component in the fresh frozen plassma that made a difference in her   bleeding.      LUIS FERNANDO/IN  dd: 04/06/2018 18:10:32 (CDT)  td: 04/06/2018 19:49:33 (CDT)  Doc ID   #[id number]  Job ID #[id number]    CC:

## 2018-04-11 PROBLEM — D50.0 ANEMIA, BLOOD LOSS: Status: ACTIVE | Noted: 2018-04-11

## 2018-04-20 ENCOUNTER — OFFICE VISIT (OUTPATIENT)
Dept: OBSTETRICS AND GYNECOLOGY | Facility: CLINIC | Age: 43
End: 2018-04-20
Payer: COMMERCIAL

## 2018-04-20 VITALS
SYSTOLIC BLOOD PRESSURE: 132 MMHG | WEIGHT: 189.63 LBS | DIASTOLIC BLOOD PRESSURE: 92 MMHG | BODY MASS INDEX: 30.47 KG/M2 | HEIGHT: 66 IN

## 2018-04-20 DIAGNOSIS — Z90.710 S/P LAPAROSCOPIC HYSTERECTOMY: ICD-10-CM

## 2018-04-20 DIAGNOSIS — Z98.890 S/P EXPLORATORY LAPAROTOMY: ICD-10-CM

## 2018-04-20 DIAGNOSIS — Z12.39 BREAST CANCER SCREENING: ICD-10-CM

## 2018-04-20 DIAGNOSIS — Z09 POSTOP CHECK: Primary | ICD-10-CM

## 2018-04-20 PROCEDURE — 99999 PR PBB SHADOW E&M-EST. PATIENT-LVL III: CPT | Mod: PBBFAC,,, | Performed by: OBSTETRICS & GYNECOLOGY

## 2018-04-20 PROCEDURE — 99499 UNLISTED E&M SERVICE: CPT | Mod: S$GLB,,, | Performed by: OBSTETRICS & GYNECOLOGY

## 2018-04-20 NOTE — PROGRESS NOTES
[facility]  Ambulatory Clinic  Obstetrics & Gynecology    Visit Date:  2018    Chief Complaint:  Post-op visit    Subjective:      Nuria Franklin is a 42 y.o.  here for post-op visit.    Pt is s/p total laparoscopic hysterectomy and bilateral salpingectomy on 3/2/18 for abnormal uterine bleeding and h/o anemia requiring blood transfusion.  After her TLH, pt developed vaginal bleeding and underwent examination under anesthesia, suture ligation of bleeding points at vaginal cuff, and diagnostic laparoscopy on 3/2/18.  Pt had no new vaginal bleeding during this hospital admission and was discharged in stable condition.  Pt was recovering well since her hospital discharge on 3/2/18 up until 3/14/18 when she again developed vaginal bleeding  Pt was admitted and underwent an exploratory laparotomy and repair of vaginal cuff by Dr. Lew who was on-call on 3/14.  After the ex lap on 3/14, pt again had developed vaginal bleeding and was taken back to OR on 3/16 for examination under anesthesia and repair of the vaginal cuff.  Hematology (Dr. Roper) was consulted this hospital stay for possible clotting deficiency, work-up for Von Willebrand so far negative, heme does not suspect clotting deficiency at this time.    Pt has no major complaints today.  Doing well since her last visit.  Denies any new vaginal bleeding.  Pt pleased with surgical results.    Pt denies any vaginal bleeding or discharge, pain, incisional problems, GI/ complaint, easy bleeding/bruising.    Older daughter present for visit, pt/mother declined [employer]  services.    Review of Systems:      Constitutional:  No fever, fatigue  HENT:  No congestion, hearing changes  Eyes:  No visual disturbance  Respiratory:  No cough, shortness of breath  Cardiovascular:  No chest pain, leg swelling  Gastrointestinal:  No abdominal pain, constipation, blood in stool   Genitourinary:  No dysuria, frequency  Skin:   "No rash, jaundice  Neurological:  No dizziness, weakness, headaches    Objective:     BP (!) 132/92 (BP Location: Left arm, Patient Position: Sitting, BP Method: Large (Manual))   Ht 5' 6" (1.676 m)   Wt 86 kg (189 lb 9.5 oz)   LMP 2018   BMI 30.60 kg/m²    Pulse 64, Resp rate 18     GENERAL:  NAD, well-nourished  HEENT:  NCAT, moist mucus membranes. Neck supple w/o masses.  LUNGS:  CTA-B  HEART:  RRR, no murmurs, gallops, or rubs  ABDOMEN:  Soft, non-tender, non-distended. Normoactive BS. No obvious organomegaly.  Pfannenstiel incision and laparoscopic incisions well healed.  EXT:  Symmetric w/o cramping, claudication, or edema. +2 distal pulses.  SKIN:  No rashes or bruising  NEURO:  Grossly intact bilaterally  PSYCH:  Mood and affect appropriate    PELVIC:  Female external genitalia w/o any obvious lesions. Normal urethral meatus. No gross lymphadenopathy.   VAGINA:  Pink, moist, well-rugated. Adequate support. Vaginal cuff intact, non-tender with no active bleeding or discharge.   CERVIX:   Surgically absent.   UTERUS:  Surgically absent.   ADNEXA:  No masses, non-tender   RECTAL:  Declined. No obvious external lesions    Chaperone present for exam.    Laboratory:    Lab Results   Component Value Date    WBC 6.8 2018    HGB 10.1 (L) 2018    HCT 34.5 2018    MCV 87 2018     2018     Assessment:    1. 42 y.o.  s/p total laparoscopic hysterectomy 3/2/18, ex-lap 3/14/18 and examination under anesthesia with repair of vaginal cuff 3/16 for post-op vaginal bleeding    Plan:    Post-op care instructions and precautions reviewed.  Surgical findings and pathology discussed with pt.  Pelvic rest x 6 wks post-op.   F/u with hematology Dr. Gilberto Roper for easy bruising/?clotting deficiency?.  Bleeding precautions.   Encourage healthy lifestyle modifications.  F/u with PCP for health maintenance.   Return 1 year for annual GYN exam, or sooner for any concerns.  Pt " voiced understanding.       Aamir Lopez MD

## 2018-05-08 ENCOUNTER — HOSPITAL ENCOUNTER (OUTPATIENT)
Dept: RADIOLOGY | Facility: HOSPITAL | Age: 43
Discharge: HOME | End: 2018-05-08
Attending: OBSTETRICS & GYNECOLOGY
Payer: COMMERCIAL

## 2018-05-08 VITALS — HEIGHT: 66 IN | BODY MASS INDEX: 30.37 KG/M2 | WEIGHT: 189 LBS

## 2018-05-08 DIAGNOSIS — Z12.39 BREAST CANCER SCREENING: ICD-10-CM

## 2018-05-08 PROCEDURE — 77067 SCR MAMMO BI INCL CAD: CPT | Mod: 26,,, | Performed by: RADIOLOGY

## 2018-05-08 PROCEDURE — 77067 SCR MAMMO BI INCL CAD: CPT | Mod: TC

## 2018-05-08 PROCEDURE — 77063 BREAST TOMOSYNTHESIS BI: CPT | Mod: 26,,, | Performed by: RADIOLOGY

## 2018-06-01 ENCOUNTER — TELEPHONE (OUTPATIENT)
Dept: OBSTETRICS AND GYNECOLOGY | Facility: CLINIC | Age: 43
End: 2018-06-01

## 2018-06-01 NOTE — TELEPHONE ENCOUNTER
----- Message from Wilfrid Cox sent at 6/1/2018 12:08 PM CDT -----  Contact: Christine(daughter)[phone]  Pt's daughter is calling on her behalf. She is requesting a form for the pt, saying that she is cleared to work and that she can lift over 40 or 50 lbs. Please call at your earliest convenience.

## 2018-06-08 ENCOUNTER — TELEPHONE (OUTPATIENT)
Dept: OBSTETRICS AND GYNECOLOGY | Facility: CLINIC | Age: 43
End: 2018-06-08

## 2018-06-21 ENCOUNTER — TELEPHONE (OUTPATIENT)
Dept: OBSTETRICS AND GYNECOLOGY | Facility: CLINIC | Age: 43
End: 2018-06-21

## 2018-06-21 NOTE — TELEPHONE ENCOUNTER
----- Message from Nan Ochoa sent at 6/21/2018  3:39 PM CDT -----  Contact: Christine [facility] [phone]/daughter  Calling TO get note to return back to work .

## 2018-06-21 NOTE — TELEPHONE ENCOUNTER
Returned call to patient. Stated that she hadn't received return to work note. Informed her that will put note up front at receptionists desk. Informed her that the office closes at 5:00pm. Pt acknowledged. CW

## 2018-06-26 PROBLEM — N93.9 ABNORMAL VAGINAL BLEEDING: Status: ACTIVE | Noted: 2018-06-26

## 2018-10-26 PROBLEM — R63.5 WEIGHT GAIN: Status: ACTIVE | Noted: 2018-10-26

## 2018-10-26 PROBLEM — E66.9 OBESITY (BMI 30-39.9): Status: ACTIVE | Noted: 2018-10-26

## 2020-06-30 ENCOUNTER — HOSPITAL ENCOUNTER (EMERGENCY)
Facility: HOSPITAL | Age: 45
Discharge: HOME | End: 2020-06-30
Attending: EMERGENCY MEDICINE
Payer: MEDICAID

## 2020-06-30 VITALS
HEART RATE: 69 BPM | DIASTOLIC BLOOD PRESSURE: 99 MMHG | OXYGEN SATURATION: 99 % | RESPIRATION RATE: 17 BRPM | WEIGHT: 200 LBS | SYSTOLIC BLOOD PRESSURE: 168 MMHG | TEMPERATURE: 99 F | BODY MASS INDEX: 33.28 KG/M2

## 2020-06-30 DIAGNOSIS — I10 HYPERTENSION, UNSPECIFIED TYPE: Primary | ICD-10-CM

## 2020-06-30 DIAGNOSIS — R42 LIGHT HEADED: ICD-10-CM

## 2020-06-30 LAB
ALBUMIN SERPL BCP-MCNC: 3.5 G/DL (ref 3.5–5.2)
ALP SERPL-CCNC: 108 U/L (ref 55–135)
ALT SERPL W/O P-5'-P-CCNC: 31 U/L (ref 10–44)
ANION GAP SERPL CALC-SCNC: 6 MMOL/L (ref 8–16)
AST SERPL-CCNC: 32 U/L (ref 10–40)
BASOPHILS # BLD AUTO: 0.03 K/UL (ref 0–0.2)
BASOPHILS NFR BLD: 0.6 % (ref 0–1.9)
BILIRUB SERPL-MCNC: 0.4 MG/DL (ref 0.1–1)
BUN SERPL-MCNC: 12 MG/DL (ref 6–20)
CALCIUM SERPL-MCNC: 8.8 MG/DL (ref 8.7–10.5)
CHLORIDE SERPL-SCNC: 110 MMOL/L (ref 95–110)
CO2 SERPL-SCNC: 23 MMOL/L (ref 23–29)
CREAT SERPL-MCNC: 0.8 MG/DL (ref 0.5–1.4)
DIFFERENTIAL METHOD: ABNORMAL
EOSINOPHIL # BLD AUTO: 0.1 K/UL (ref 0–0.5)
EOSINOPHIL NFR BLD: 1.6 % (ref 0–8)
ERYTHROCYTE [DISTWIDTH] IN BLOOD BY AUTOMATED COUNT: 13.5 % (ref 11.5–14.5)
EST. GFR  (AFRICAN AMERICAN): >60 ML/MIN/1.73 M^2
EST. GFR  (NON AFRICAN AMERICAN): >60 ML/MIN/1.73 M^2
GLUCOSE SERPL-MCNC: 104 MG/DL (ref 70–110)
HCT VFR BLD AUTO: 36.1 % (ref 37–48.5)
HGB BLD-MCNC: 11.5 G/DL (ref 12–16)
IMM GRANULOCYTES # BLD AUTO: 0.03 K/UL (ref 0–0.04)
IMM GRANULOCYTES NFR BLD AUTO: 0.6 % (ref 0–0.5)
LYMPHOCYTES # BLD AUTO: 1.7 K/UL (ref 1–4.8)
LYMPHOCYTES NFR BLD: 32.2 % (ref 18–48)
MCH RBC QN AUTO: 27.1 PG (ref 27–31)
MCHC RBC AUTO-ENTMCNC: 31.9 G/DL (ref 32–36)
MCV RBC AUTO: 85 FL (ref 82–98)
MONOCYTES # BLD AUTO: 0.5 K/UL (ref 0.3–1)
MONOCYTES NFR BLD: 9.3 % (ref 4–15)
NEUTROPHILS # BLD AUTO: 2.9 K/UL (ref 1.8–7.7)
NEUTROPHILS NFR BLD: 55.7 % (ref 38–73)
NRBC BLD-RTO: 0 /100 WBC
PLATELET # BLD AUTO: 179 K/UL (ref 150–350)
PMV BLD AUTO: 10.6 FL (ref 9.2–12.9)
POTASSIUM SERPL-SCNC: 3.5 MMOL/L (ref 3.5–5.1)
PROT SERPL-MCNC: 7.4 G/DL (ref 6–8.4)
RBC # BLD AUTO: 4.24 M/UL (ref 4–5.4)
SODIUM SERPL-SCNC: 139 MMOL/L (ref 136–145)
TROPONIN I SERPL DL<=0.01 NG/ML-MCNC: <0.006 NG/ML (ref 0–0.03)
WBC # BLD AUTO: 5.15 K/UL (ref 3.9–12.7)

## 2020-06-30 PROCEDURE — 93010 ELECTROCARDIOGRAM REPORT: CPT | Mod: ,,, | Performed by: INTERNAL MEDICINE

## 2020-06-30 PROCEDURE — 85025 COMPLETE CBC W/AUTO DIFF WBC: CPT

## 2020-06-30 PROCEDURE — 99285 EMERGENCY DEPT VISIT HI MDM: CPT | Mod: 25

## 2020-06-30 PROCEDURE — 96361 HYDRATE IV INFUSION ADD-ON: CPT

## 2020-06-30 PROCEDURE — 84484 ASSAY OF TROPONIN QUANT: CPT

## 2020-06-30 PROCEDURE — 25000003 PHARM REV CODE 250: Performed by: EMERGENCY MEDICINE

## 2020-06-30 PROCEDURE — 96360 HYDRATION IV INFUSION INIT: CPT

## 2020-06-30 PROCEDURE — 63600175 PHARM REV CODE 636 W HCPCS: Performed by: EMERGENCY MEDICINE

## 2020-06-30 PROCEDURE — 80053 COMPREHEN METABOLIC PANEL: CPT

## 2020-06-30 PROCEDURE — 93010 EKG 12-LEAD: ICD-10-PCS | Mod: ,,, | Performed by: INTERNAL MEDICINE

## 2020-06-30 PROCEDURE — 93005 ELECTROCARDIOGRAM TRACING: CPT

## 2020-06-30 RX ORDER — AMLODIPINE BESYLATE 5 MG/1
5 TABLET ORAL
Status: COMPLETED | OUTPATIENT
Start: 2020-06-30 | End: 2020-06-30

## 2020-06-30 RX ORDER — AMLODIPINE BESYLATE 5 MG/1
5 TABLET ORAL DAILY
Qty: 30 TABLET | Refills: 0 | Status: SHIPPED | OUTPATIENT
Start: 2020-06-30 | End: 2021-06-30

## 2020-06-30 RX ADMIN — SODIUM CHLORIDE, SODIUM LACTATE, POTASSIUM CHLORIDE, AND CALCIUM CHLORIDE 1000 ML: .6; .31; .03; .02 INJECTION, SOLUTION INTRAVENOUS at 07:06

## 2020-06-30 RX ADMIN — AMLODIPINE BESYLATE 5 MG: 5 TABLET ORAL at 07:06

## 2020-06-30 NOTE — ED TRIAGE NOTES
44 y.o female presents to the ED with chief complaint of hypertension. Pt reports non-compliance with BP medications x 6 months. Pt reports tingling in bilateral fingertips x 2 days and dizziness and acute lighheadedness x 2 days. Pt reports feeling lightheaded this morning, feeling like she was going to pass out. Pt denies LOC. Pt denies CP, SOB, N/V/D, and dysuria. AAOx4, NAD.     Past Medical History:   Diagnosis Date    Anemia     Miscarriage     2013    Preeclampsia 10/6/2014       Past Surgical History:   Procedure Laterality Date    DILATION AND CURETTAGE OF UTERUS      HYSTERECTOMY

## 2020-06-30 NOTE — ED PROVIDER NOTES
Encounter Date: 6/30/2020    SCRIBE #1 NOTE: I, Vel Cole, am scribing for, and in the presence of,  Cecily Trotter MD. I have scribed the following portions of the note - Other sections scribed: HPI, ROS, PE.       History     Chief Complaint   Patient presents with    Hypertension     Pt c/o HTN, noncompliant with meds x 6mo, pt states last 2 days c/o of tingling in fingertips and dizziness.  Pt AAOx4     CC: Lightheadedness    HPI: This is a 44 y.o. F who has HTN, and Anemia who presents to the ED complaining of acute lightheadedness that began 2 days ago. Pt's lightheadedness has been intermittent since onset. She reports feeling fine yesterday. However, upon waking this morning she experienced lightheadedness feeling like she was going to pass out, but she did not. Denies spinning sensation. Additionally, the pt states that she ran out of her blood pressure medication 6 months ago. Pt's PCP is Dr. Lopez. She has no known drug allergies. Pt denies CP, SOB, nausea, vomiting, diarrhea, dysuria, or tobacco use.    The history is provided by the patient. No  was used.     Review of patient's allergies indicates:  No Known Allergies  Past Medical History:   Diagnosis Date    Anemia     Miscarriage     2013    Preeclampsia 10/6/2014     Past Surgical History:   Procedure Laterality Date    DILATION AND CURETTAGE OF UTERUS      HYSTERECTOMY       No family history on file.  Social History     Tobacco Use    Smoking status: Never Smoker    Smokeless tobacco: Never Used   Substance Use Topics    Alcohol use: No    Drug use: No     Review of Systems   Constitutional: Negative for chills and fever.   HENT: Negative for congestion and sore throat.    Eyes: Negative for visual disturbance.   Respiratory: Negative for cough and shortness of breath.    Cardiovascular: Negative for chest pain.   Gastrointestinal: Negative for abdominal pain, diarrhea, nausea and vomiting.   Genitourinary:  Negative for dysuria and vaginal discharge.   Skin: Negative for rash.   Neurological: Positive for light-headedness. Negative for syncope and headaches.   Psychiatric/Behavioral: Negative for decreased concentration.       Physical Exam     Initial Vitals [06/30/20 0656]   BP Pulse Resp Temp SpO2   (!) 180/108 80 20 98.2 °F (36.8 °C) 98 %      MAP       --         Physical Exam    Nursing note and vitals reviewed.  Constitutional: She appears well-developed and well-nourished. She is not diaphoretic. No distress.   HENT:   Head: Normocephalic and atraumatic.   Mouth/Throat: Oropharynx is clear and moist.   Eyes: Pupils are equal, round, and reactive to light. Right eye exhibits no nystagmus. Left eye exhibits no nystagmus.   Neck: Neck supple.   Cardiovascular: Normal rate and regular rhythm.   Pulmonary/Chest: Breath sounds normal.   Abdominal: Soft. There is no abdominal tenderness.   Musculoskeletal: No edema.   Neurological: She is alert and oriented to person, place, and time. She has normal strength. No cranial nerve deficit or sensory deficit. GCS score is 15. GCS eye subscore is 4. GCS verbal subscore is 5. GCS motor subscore is 6.   Skin: Skin is warm and dry.   Psychiatric: She has a normal mood and affect.         ED Course   Procedures  Labs Reviewed   CBC W/ AUTO DIFFERENTIAL - Abnormal; Notable for the following components:       Result Value    Hemoglobin 11.5 (*)     Hematocrit 36.1 (*)     Mean Corpuscular Hemoglobin Conc 31.9 (*)     Immature Granulocytes 0.6 (*)     All other components within normal limits   COMPREHENSIVE METABOLIC PANEL - Abnormal; Notable for the following components:    Anion Gap 6 (*)     All other components within normal limits   TROPONIN I        ECG Results          EKG 12-lead (Preliminary result)  Result time 06/30/20 07:42:17    ED Interpretation by Cecily Trotter MD (06/30/20 07:42:17)    Normal sinus rhythm, rate 75 beats per minute, normal AL interval,   milliseconds.  No STEMI.                            Imaging Results          X-Ray Chest AP Portable (Final result)  Result time 06/30/20 08:59:19    Final result by Nilton Palomares MD (06/30/20 08:59:19)                 Impression:      See above      Electronically signed by: Nilton Palomares MD  Date:    06/30/2020  Time:    08:59             Narrative:    EXAMINATION:  XR CHEST AP PORTABLE    CLINICAL HISTORY:  light headed;    TECHNIQUE:  Single frontal view of the chest was performed.    COMPARISON:  01/26/2010    FINDINGS:  Cardiac size is mildly prominent.  Lungs are clear.  No infiltrate or cardiac failure is identified.                                 Medical Decision Making:   Initial Assessment:   44-year-old female presenting with lightheadedness, hypertension.  No other associated symptoms.  On exam, the patient appears well, she is in no distress.  She is hypertensive.  She has a nonfocal neurologic exam.  Differential includes but not limited to electrolyte disturbance, anemia, orthostatic hypotension, dysrhythmia.  Workup initiated with labs, EKG, chest x-ray, orthostatics.  Will start on amlodipine.  ED Management:  Labs within acceptable limits.  On reassessment, after receiving IV fluids patient reports feeling improved.  She denies complaints.  Will start on amlodipine, refer back to primary care for blood pressure recheck.                                 Clinical Impression:       ICD-10-CM ICD-9-CM   1. Hypertension, unspecified type  I10 401.9   2. Light headed  R42 780.4             ED Disposition Condition    Discharge Stable        ED Prescriptions     Medication Sig Dispense Start Date End Date Auth. Provider    amLODIPine (NORVASC) 5 MG tablet Take 1 tablet (5 mg total) by mouth once daily. 30 tablet 6/30/2020 6/30/2021 Cecily Trotter MD        Follow-up Information     Follow up With Specialties Details Why Contact Info    Uziel Lopez MD Internal Medicine Schedule an  appointment as soon as possible for a visit in 1 week To recheck your symptoms, To recheck your blood pressure [address] [facility] [id number] [phone]      [facility] Emergency Medicine  As needed, If symptoms worsen [address] [place] [address] [phone]                         I, Cecily Trotter MD, personally performed the services described in this documentation. All medical record entries made by the scribe were at my direction and in my presence.  I have reviewed the chart and agree that the record reflects my personal performance and is accurate and complete.      This dictation has been generated using M-Modal Fluency Direct dictation; some phonetic errors may occur.        Cecily Trotter MD  06/30/20 [id number]

## 2020-07-13 DIAGNOSIS — Z12.31 SCREENING MAMMOGRAM FOR HIGH-RISK PATIENT: Primary | ICD-10-CM

## 2020-09-01 ENCOUNTER — HOSPITAL ENCOUNTER (OUTPATIENT)
Dept: RADIOLOGY | Facility: HOSPITAL | Age: 45
Discharge: HOME | End: 2020-09-01
Attending: NURSE PRACTITIONER
Payer: MEDICAID

## 2020-09-01 VITALS — HEIGHT: 65 IN | WEIGHT: 200 LBS | BODY MASS INDEX: 33.32 KG/M2

## 2020-09-01 PROCEDURE — 77067 SCR MAMMO BI INCL CAD: CPT | Mod: 26,,, | Performed by: RADIOLOGY

## 2020-09-01 PROCEDURE — 77067 MAMMO DIGITAL SCREENING BILAT WITH CAD: ICD-10-PCS | Mod: 26,,, | Performed by: RADIOLOGY

## 2020-09-01 PROCEDURE — 77067 SCR MAMMO BI INCL CAD: CPT | Mod: TC

## 2020-12-01 ENCOUNTER — HOSPITAL ENCOUNTER (EMERGENCY)
Facility: HOSPITAL | Age: 45
Discharge: HOME | End: 2020-12-01
Attending: EMERGENCY MEDICINE
Payer: MEDICAID

## 2020-12-01 VITALS
OXYGEN SATURATION: 100 % | HEART RATE: 85 BPM | BODY MASS INDEX: 32.28 KG/M2 | SYSTOLIC BLOOD PRESSURE: 150 MMHG | RESPIRATION RATE: 18 BRPM | TEMPERATURE: 98 F | DIASTOLIC BLOOD PRESSURE: 80 MMHG | HEIGHT: 66 IN

## 2020-12-01 DIAGNOSIS — S39.012A STRAIN OF LUMBAR REGION, INITIAL ENCOUNTER: Primary | ICD-10-CM

## 2020-12-01 PROCEDURE — 96372 THER/PROPH/DIAG INJ SC/IM: CPT

## 2020-12-01 PROCEDURE — 25000003 PHARM REV CODE 250: Performed by: EMERGENCY MEDICINE

## 2020-12-01 PROCEDURE — 63600175 PHARM REV CODE 636 W HCPCS: Performed by: EMERGENCY MEDICINE

## 2020-12-01 PROCEDURE — 99284 EMERGENCY DEPT VISIT MOD MDM: CPT | Mod: 25

## 2020-12-01 RX ORDER — METHOCARBAMOL 500 MG/1
500 TABLET, FILM COATED ORAL 3 TIMES DAILY
Qty: 15 TABLET | Refills: 0 | Status: SHIPPED | OUTPATIENT
Start: 2020-12-01 | End: 2020-12-06

## 2020-12-01 RX ORDER — TRAMADOL HYDROCHLORIDE 50 MG/1
50 TABLET ORAL EVERY 6 HOURS PRN
Qty: 12 TABLET | Refills: 0 | Status: SHIPPED | OUTPATIENT
Start: 2020-12-01 | End: 2020-12-11

## 2020-12-01 RX ORDER — METHOCARBAMOL 500 MG/1
1000 TABLET, FILM COATED ORAL
Status: COMPLETED | OUTPATIENT
Start: 2020-12-01 | End: 2020-12-01

## 2020-12-01 RX ORDER — KETOROLAC TROMETHAMINE 30 MG/ML
30 INJECTION, SOLUTION INTRAMUSCULAR; INTRAVENOUS
Status: COMPLETED | OUTPATIENT
Start: 2020-12-01 | End: 2020-12-01

## 2020-12-01 RX ORDER — MELOXICAM 7.5 MG/1
7.5 TABLET ORAL DAILY
Qty: 20 TABLET | Refills: 0 | Status: SHIPPED | OUTPATIENT
Start: 2020-12-01

## 2020-12-01 RX ADMIN — METHOCARBAMOL TABLETS 1000 MG: 500 TABLET, COATED ORAL at 04:12

## 2020-12-01 RX ADMIN — KETOROLAC TROMETHAMINE 30 MG: 30 INJECTION, SOLUTION INTRAMUSCULAR at 04:12

## 2020-12-01 NOTE — ED TRIAGE NOTES
"Pt presents to the ED with c/o lower, mid-back pain x1 week. States it feels like someone is squeezing her back. Reports she was walking when she suddenly felt "the cramp start." Reports no relief with any home remedies. Denies any radiation of pain down the legs or any other symptoms.  "

## 2020-12-01 NOTE — ED PROVIDER NOTES
Encounter Date: 12/1/2020       History     Chief Complaint   Patient presents with    Back Pain     x 1 week, no trauma, lower back w/ spasms     Patient presents for evaluation of lower back pain this been present for approximately 1 week.  Patient states symptoms started while she was walking.  She does not recall an injury.  She denies heavy lifting or fall.  Symptoms worse when she sits down.  She could not sleep tonight because the pain.  She has been taking Tylenol and putting icy hot patches on her back without relief.  No radiation of pain.  No incontinence.  No saddle anesthesia.  No weakness.  No numbness.  No fever.  No abdominal pain.  No urinary symptoms.        Review of patient's allergies indicates:  No Known Allergies  Past Medical History:   Diagnosis Date    Anemia     Miscarriage     2013    Preeclampsia 10/6/2014     Past Surgical History:   Procedure Laterality Date    DILATION AND CURETTAGE OF UTERUS      HYSTERECTOMY       History reviewed. No pertinent family history.  Social History     Tobacco Use    Smoking status: Never Smoker    Smokeless tobacco: Never Used   Substance Use Topics    Alcohol use: No    Drug use: No     Review of Systems   Constitutional: Negative for chills and fever.   Respiratory: Negative for shortness of breath.    Cardiovascular: Negative for chest pain.   Gastrointestinal: Negative for abdominal pain, diarrhea, nausea and vomiting.        No melena.   Genitourinary: Negative for dysuria, flank pain and hematuria.   Musculoskeletal: Positive for back pain.   Skin: Negative for rash and wound.   Neurological: Negative for weakness, numbness and headaches.   All other systems reviewed and are negative.      Physical Exam     Initial Vitals [12/01/20 0329]   BP Pulse Resp Temp SpO2   (!) 172/102 (!) 130 20 98.1 °F (36.7 °C) 97 %      MAP       --         Physical Exam    Nursing note and vitals reviewed.  Constitutional: She appears well-developed and  well-nourished. She is not diaphoretic. No distress.   Neck: Normal range of motion.   Pulmonary/Chest: No respiratory distress.   Abdominal: Soft. Bowel sounds are normal. She exhibits no distension. There is no abdominal tenderness. There is no rebound and no guarding.   Musculoskeletal: No edema.      Comments: Pain with range of motion lower back.  Tender palpation over lumbar paraspinous muscles on the left.  No midline tenderness.  Moderate spasm present.  No thoracic spine tenderness.  Negative straight leg raise bilaterally.   Neurological: She is alert and oriented to person, place, and time. She has normal strength. No cranial nerve deficit or sensory deficit.   Skin: Skin is warm and dry. No rash noted.   Psychiatric: She has a normal mood and affect.         ED Course   Procedures  Labs Reviewed - No data to display       Imaging Results          X-Ray Lumbar Spine Ap And Lateral (Final result)  Result time 12/01/20 04:24:34    Final result by Nilton Rose MD (12/01/20 04:24:34)                 Impression:      No obvious evidence of an acute injury involving the lumbar vertebral column.    Minimal osteoarthritic changes are appreciated.      Electronically signed by: Nilton Rose  Date:    12/01/2020  Time:    04:24             Narrative:    EXAMINATION:  XR LUMBAR SPINE AP AND LATERAL    CLINICAL HISTORY:  Back pain or radiculopathy, < 6 wks, uncomplicated;    TECHNIQUE:  AP, lateral and spot images were performed of the lumbar spine.    COMPARISON:  None    FINDINGS:  Multiple views of the lumbar spine reveals 5 non-rib-bearing lumbar vertebral bodies.  No indication of a scoliotic curvature.  No hemivertebra or rotatory component.  The lateral projection reveals a normal lordotic curvature.  The intervertebral disc spacing is well preserved.  The posterior elements align normally.  There are minimal osteoarthritic changes noted.  No indication of spondylolisthesis.                                  Medical Decision Making:   Initial Assessment:   Patient presents for nontraumatic lower back pain for 1 week.  Negative straight leg raise.  Normal neurologic exam.  Tender palpation over lumbar paraspinous muscles.  Will obtain imaging.  Will provide NSAIDs and muscle relaxers and reassess.  Differential Diagnosis:   Muscle strain, osteoarthritis, disc herniation  Clinical Tests:   Radiological Study: Ordered and Reviewed  ED Management:  0430:  Lumbar spine x-ray unremarkable.  Patient may follow up primary care for treatment of lumbar strain.                             Clinical Impression:     ICD-10-CM ICD-9-CM   1. Strain of lumbar region, initial encounter  S39.012A 847.2                      Disposition:   Disposition: Discharged  Condition: Stable     ED Disposition Condition    Discharge Stable        ED Prescriptions     Medication Sig Dispense Start Date End Date Auth. Provider    meloxicam (MOBIC) 7.5 MG tablet Take 1 tablet (7.5 mg total) by mouth once daily. 20 tablet 12/1/2020  Behzad Carter MD    methocarbamoL (ROBAXIN) 500 MG Tab Take 1 tablet (500 mg total) by mouth 3 (three) times daily. for 5 days 15 tablet 12/1/2020 12/6/2020 Behzad Carter MD    traMADoL (ULTRAM) 50 mg tablet Take 1 tablet (50 mg total) by mouth every 6 (six) hours as needed for Pain. 12 tablet 12/1/2020 12/11/2020 Behazd Catrer MD        Follow-up Information     Follow up With Specialties Details Why Contact Info    [facility] [place]  Call  As needed [address]  [place] [address]  [phone]                                         Behzad Carter MD  12/01/20 0433

## 2021-09-28 ENCOUNTER — HOSPITAL ENCOUNTER (OUTPATIENT)
Dept: RADIOLOGY | Facility: HOSPITAL | Age: 46
Discharge: HOME | End: 2021-09-28
Attending: NURSE PRACTITIONER
Payer: MEDICAID

## 2021-09-28 VITALS — HEIGHT: 66 IN | WEIGHT: 200 LBS | BODY MASS INDEX: 32.14 KG/M2

## 2021-09-28 DIAGNOSIS — Z12.31 ENCOUNTER FOR SCREENING MAMMOGRAM FOR MALIGNANT NEOPLASM OF BREAST: ICD-10-CM

## 2021-09-28 PROCEDURE — 77067 SCR MAMMO BI INCL CAD: CPT | Mod: 26,,, | Performed by: RADIOLOGY

## 2021-09-28 PROCEDURE — 77067 SCR MAMMO BI INCL CAD: CPT | Mod: TC

## 2021-09-28 PROCEDURE — 77067 MAMMO DIGITAL SCREENING BILAT: ICD-10-PCS | Mod: 26,,, | Performed by: RADIOLOGY

## 2021-12-13 ENCOUNTER — HOSPITAL ENCOUNTER (EMERGENCY)
Facility: HOSPITAL | Age: 46
Discharge: HOME | End: 2021-12-13
Attending: EMERGENCY MEDICINE
Payer: MEDICAID

## 2021-12-13 VITALS
TEMPERATURE: 99 F | DIASTOLIC BLOOD PRESSURE: 88 MMHG | SYSTOLIC BLOOD PRESSURE: 145 MMHG | HEART RATE: 88 BPM | OXYGEN SATURATION: 98 % | RESPIRATION RATE: 18 BRPM

## 2021-12-13 DIAGNOSIS — K13.79 ORAL BLEEDING: ICD-10-CM

## 2021-12-13 DIAGNOSIS — K08.9 POOR DENTITION: Primary | ICD-10-CM

## 2021-12-13 LAB
APTT BLDCRRT: 27.3 SEC (ref 21–32)
BASOPHILS # BLD AUTO: 0.04 K/UL (ref 0–0.2)
BASOPHILS NFR BLD: 0.5 % (ref 0–1.9)
DIFFERENTIAL METHOD: ABNORMAL
EOSINOPHIL # BLD AUTO: 0 K/UL (ref 0–0.5)
EOSINOPHIL NFR BLD: 0.5 % (ref 0–8)
ERYTHROCYTE [DISTWIDTH] IN BLOOD BY AUTOMATED COUNT: 13.7 % (ref 11.5–14.5)
HCT VFR BLD AUTO: 34.1 % (ref 37–48.5)
HGB BLD-MCNC: 10.7 G/DL (ref 12–16)
IMM GRANULOCYTES # BLD AUTO: 0.01 K/UL (ref 0–0.04)
IMM GRANULOCYTES NFR BLD AUTO: 0.1 % (ref 0–0.5)
INR PPP: 1 (ref 0.8–1.2)
LYMPHOCYTES # BLD AUTO: 1.4 K/UL (ref 1–4.8)
LYMPHOCYTES NFR BLD: 17.5 % (ref 18–48)
MCH RBC QN AUTO: 27.2 PG (ref 27–31)
MCHC RBC AUTO-ENTMCNC: 31.4 G/DL (ref 32–36)
MCV RBC AUTO: 87 FL (ref 82–98)
MONOCYTES # BLD AUTO: 0.5 K/UL (ref 0.3–1)
MONOCYTES NFR BLD: 6.7 % (ref 4–15)
NEUTROPHILS # BLD AUTO: 6 K/UL (ref 1.8–7.7)
NEUTROPHILS NFR BLD: 74.7 % (ref 38–73)
NRBC BLD-RTO: 0 /100 WBC
PLATELET # BLD AUTO: 193 K/UL (ref 150–450)
PMV BLD AUTO: 10.9 FL (ref 9.2–12.9)
PROTHROMBIN TIME: 10.4 SEC (ref 9–12.5)
RBC # BLD AUTO: 3.93 M/UL (ref 4–5.4)
WBC # BLD AUTO: 8.05 K/UL (ref 3.9–12.7)

## 2021-12-13 PROCEDURE — 85730 THROMBOPLASTIN TIME PARTIAL: CPT | Performed by: EMERGENCY MEDICINE

## 2021-12-13 PROCEDURE — 99283 EMERGENCY DEPT VISIT LOW MDM: CPT

## 2021-12-13 PROCEDURE — 85025 COMPLETE CBC W/AUTO DIFF WBC: CPT | Performed by: EMERGENCY MEDICINE

## 2021-12-13 PROCEDURE — 85610 PROTHROMBIN TIME: CPT | Performed by: EMERGENCY MEDICINE

## 2021-12-19 ENCOUNTER — HOSPITAL ENCOUNTER (EMERGENCY)
Facility: HOSPITAL | Age: 46
Discharge: ANOTHER HOSPITAL | End: 2021-12-19
Attending: EMERGENCY MEDICINE
Payer: MEDICAID

## 2021-12-19 VITALS
DIASTOLIC BLOOD PRESSURE: 76 MMHG | HEIGHT: 66 IN | OXYGEN SATURATION: 99 % | HEART RATE: 110 BPM | TEMPERATURE: 99 F | WEIGHT: 200 LBS | SYSTOLIC BLOOD PRESSURE: 121 MMHG | RESPIRATION RATE: 18 BRPM | BODY MASS INDEX: 32.14 KG/M2

## 2021-12-19 DIAGNOSIS — K06.8 GINGIVAL BLEEDING: Primary | ICD-10-CM

## 2021-12-19 LAB
BASOPHILS # BLD AUTO: 0.02 K/UL (ref 0–0.2)
BASOPHILS NFR BLD: 0.2 % (ref 0–1.9)
CTP QC/QA: YES
DIFFERENTIAL METHOD: ABNORMAL
EOSINOPHIL # BLD AUTO: 0 K/UL (ref 0–0.5)
EOSINOPHIL NFR BLD: 0.1 % (ref 0–8)
ERYTHROCYTE [DISTWIDTH] IN BLOOD BY AUTOMATED COUNT: 14.5 % (ref 11.5–14.5)
HCT VFR BLD AUTO: 22.4 % (ref 37–48.5)
HGB BLD-MCNC: 6.9 G/DL (ref 12–16)
IMM GRANULOCYTES # BLD AUTO: 0.05 K/UL (ref 0–0.04)
IMM GRANULOCYTES NFR BLD AUTO: 0.5 % (ref 0–0.5)
LYMPHOCYTES # BLD AUTO: 1.9 K/UL (ref 1–4.8)
LYMPHOCYTES NFR BLD: 17 % (ref 18–48)
MCH RBC QN AUTO: 27.3 PG (ref 27–31)
MCHC RBC AUTO-ENTMCNC: 30.8 G/DL (ref 32–36)
MCV RBC AUTO: 89 FL (ref 82–98)
MONOCYTES # BLD AUTO: 0.6 K/UL (ref 0.3–1)
MONOCYTES NFR BLD: 5.2 % (ref 4–15)
NEUTROPHILS # BLD AUTO: 8.5 K/UL (ref 1.8–7.7)
NEUTROPHILS NFR BLD: 77 % (ref 38–73)
NRBC BLD-RTO: 0 /100 WBC
PLATELET # BLD AUTO: 205 K/UL (ref 150–450)
PMV BLD AUTO: 10.9 FL (ref 9.2–12.9)
RBC # BLD AUTO: 2.53 M/UL (ref 4–5.4)
SARS-COV-2 RDRP RESP QL NAA+PROBE: NEGATIVE
WBC # BLD AUTO: 10.98 K/UL (ref 3.9–12.7)

## 2021-12-19 PROCEDURE — 99285 EMERGENCY DEPT VISIT HI MDM: CPT | Mod: 25

## 2021-12-19 PROCEDURE — U0002 COVID-19 LAB TEST NON-CDC: HCPCS | Performed by: EMERGENCY MEDICINE

## 2021-12-19 PROCEDURE — 85025 COMPLETE CBC W/AUTO DIFF WBC: CPT | Performed by: EMERGENCY MEDICINE

## 2022-09-29 ENCOUNTER — HOSPITAL ENCOUNTER (OUTPATIENT)
Dept: RADIOLOGY | Facility: HOSPITAL | Age: 47
Discharge: HOME | End: 2022-09-29
Attending: NURSE PRACTITIONER
Payer: MEDICAID

## 2022-09-29 VITALS — WEIGHT: 199.94 LBS | HEIGHT: 66 IN | BODY MASS INDEX: 32.13 KG/M2

## 2022-09-29 DIAGNOSIS — Z12.31 ENCOUNTER FOR SCREENING MAMMOGRAM FOR MALIGNANT NEOPLASM OF BREAST: ICD-10-CM

## 2022-09-29 PROCEDURE — 77063 BREAST TOMOSYNTHESIS BI: CPT | Mod: 26,,, | Performed by: RADIOLOGY

## 2022-09-29 PROCEDURE — 77067 SCR MAMMO BI INCL CAD: CPT | Mod: TC

## 2022-09-29 PROCEDURE — 77063 MAMMO DIGITAL SCREENING BILAT WITH TOMO: ICD-10-PCS | Mod: 26,,, | Performed by: RADIOLOGY

## 2022-09-29 PROCEDURE — 77067 SCR MAMMO BI INCL CAD: CPT | Mod: 26,,, | Performed by: RADIOLOGY

## 2022-09-29 PROCEDURE — 77067 MAMMO DIGITAL SCREENING BILAT WITH TOMO: ICD-10-PCS | Mod: 26,,, | Performed by: RADIOLOGY

## 2022-11-15 ENCOUNTER — HOSPITAL ENCOUNTER (OUTPATIENT)
Dept: RADIOLOGY | Facility: HOSPITAL | Age: 47
Discharge: HOME | End: 2022-11-15
Attending: NURSE PRACTITIONER
Payer: MEDICAID

## 2022-11-15 DIAGNOSIS — R10.2 PELVIC AND PERINEAL PAIN: ICD-10-CM

## 2022-11-15 PROCEDURE — 76856 US PELVIS COMP WITH TRANSVAG NON-OB (XPD): ICD-10-PCS | Mod: 26,,, | Performed by: RADIOLOGY

## 2022-11-15 PROCEDURE — 76856 US EXAM PELVIC COMPLETE: CPT | Mod: 26,,, | Performed by: RADIOLOGY

## 2022-11-15 PROCEDURE — 76830 TRANSVAGINAL US NON-OB: CPT | Mod: 26,,, | Performed by: RADIOLOGY

## 2022-11-15 PROCEDURE — 76830 US PELVIS COMP WITH TRANSVAG NON-OB (XPD): ICD-10-PCS | Mod: 26,,, | Performed by: RADIOLOGY

## 2022-11-15 PROCEDURE — 76830 TRANSVAGINAL US NON-OB: CPT | Mod: TC

## 2023-06-16 ENCOUNTER — TELEPHONE (OUTPATIENT)
Dept: OBSTETRICS AND GYNECOLOGY | Facility: CLINIC | Age: 48
End: 2023-06-16
Payer: MEDICAID

## 2023-06-16 NOTE — TELEPHONE ENCOUNTER
Left MessageCommunicated - LVM for pt that her appt was rescheduled to 6/21/2023 @ 1110 am thank you

## 2024-04-22 ENCOUNTER — OFFICE VISIT (OUTPATIENT)
Dept: OBSTETRICS AND GYNECOLOGY | Facility: CLINIC | Age: 49
End: 2024-04-22
Payer: MEDICAID

## 2024-04-22 VITALS
BODY MASS INDEX: 34.87 KG/M2 | SYSTOLIC BLOOD PRESSURE: 132 MMHG | DIASTOLIC BLOOD PRESSURE: 80 MMHG | WEIGHT: 216.06 LBS

## 2024-04-22 DIAGNOSIS — Z01.419 WELL WOMAN EXAM WITH ROUTINE GYNECOLOGICAL EXAM: Primary | ICD-10-CM

## 2024-04-22 DIAGNOSIS — Z12.11 COLON CANCER SCREENING: ICD-10-CM

## 2024-04-22 DIAGNOSIS — Z12.31 SCREENING MAMMOGRAM FOR HIGH-RISK PATIENT: ICD-10-CM

## 2024-04-22 PROCEDURE — 1160F RVW MEDS BY RX/DR IN RCRD: CPT | Mod: CPTII,,, | Performed by: OBSTETRICS & GYNECOLOGY

## 2024-04-22 PROCEDURE — 99459 PELVIC EXAMINATION: CPT | Mod: S$PBB,,, | Performed by: OBSTETRICS & GYNECOLOGY

## 2024-04-22 PROCEDURE — 3008F BODY MASS INDEX DOCD: CPT | Mod: CPTII,,, | Performed by: OBSTETRICS & GYNECOLOGY

## 2024-04-22 PROCEDURE — 99459 PELVIC EXAMINATION: CPT | Mod: PBBFAC | Performed by: OBSTETRICS & GYNECOLOGY

## 2024-04-22 PROCEDURE — 3075F SYST BP GE 130 - 139MM HG: CPT | Mod: CPTII,,, | Performed by: OBSTETRICS & GYNECOLOGY

## 2024-04-22 PROCEDURE — 99213 OFFICE O/P EST LOW 20 MIN: CPT | Mod: PBBFAC | Performed by: OBSTETRICS & GYNECOLOGY

## 2024-04-22 PROCEDURE — 99386 PREV VISIT NEW AGE 40-64: CPT | Mod: S$PBB,,, | Performed by: OBSTETRICS & GYNECOLOGY

## 2024-04-22 PROCEDURE — 1159F MED LIST DOCD IN RCRD: CPT | Mod: CPTII,,, | Performed by: OBSTETRICS & GYNECOLOGY

## 2024-04-22 PROCEDURE — 99999 PR PBB SHADOW E&M-EST. PATIENT-LVL III: CPT | Mod: PBBFAC,,, | Performed by: OBSTETRICS & GYNECOLOGY

## 2024-04-22 PROCEDURE — 3079F DIAST BP 80-89 MM HG: CPT | Mod: CPTII,,, | Performed by: OBSTETRICS & GYNECOLOGY

## 2024-04-22 NOTE — PROGRESS NOTES
[facility]  Ambulatory Clinic  Obstetrics & Gynecology    Visit Date:  2024    Chief Complaint:  Annual GYN exam    History of Present Illness:      Nuria Franklin is a 48 y.o.  here for a gynecologic exam.      Pt has no major complaints today.    Pt has a h/o total laparoscopic hysterectomy and bilateral salpingectomy on 3/2/18 for abnormal uterine bleeding and h/o anemia requiring blood transfusion.    Last mammo ~ was benign.    Pt denies vaginal bleeding, vaginal discharge, dyspareunia, pelvic pain, bloating, early satiety, unintentional weight loss, breast mass/skin changes, incontinence, GI or urinary complaints.      Otherwise, the pt is in her usual state of health.    Past History:  Gynecologic history as noted above.    Review of Systems:      GENERAL:  No fever, fatigue, excessive weight gain or loss  HEENT:  No headaches, hearing changes, visual disturbance  RESPIRATORY:  No cough, shortness of breath  CARDIOVASCULAR:  No chest pain, heart palpitations, leg swelling  BREAST:  No lump, pain, nipple discharge, skin changes  GASTROINTESTINAL:  No nausea, vomiting, constipation, diarrhea, abd pain, rectal bleeding   GENITOURINARY:  See HPI  ENDOCRINE:  No heat or cold intolerance  HEMATOLOGIC:  No easy bruisability or bleeding   LYMPHATICS:  No enlarged nodes  MUSCULOSKELETAL:  No acute joint pain or swelling  SKIN:  No rash, lesions, jaundice  NEUROLOGIC:  No dizziness, weakness, syncope  PSYCHIATRIC:  No significant mood changes, homicidal/suicidal ideations, abuse    Physical Exam:     /80   Wt 98 kg (216 lb 0.8 oz)   LMP 2018   BMI 34.87 kg/m²   Pulse 60's, Resp rate 12     GENERAL:  No acute distress, well-nourished  HEENT:  Atraumatic, anicteric, moist mucus membranes. Neck supple w/o masses.  BREAST:  Symmetric, nontender, no obvious masses, adenopathy, skin changes or nipple discharge.  LUNGS:  Clear normal respiratory effort  HEART:   Regular rate and rhythm, no murmurs, gallops, or rubs  ABDOMEN:  Soft, non-tender, non-distended, normoactive bowel sounds, no obvious organomegaly  EXT:  Symmetric w/o cramping, claudication, or edema. +2 distal pulses.  SKIN:  No rashes or bruising  PSYCH:  Mood and affect appropriate  NEURO:  Grossly intact bilaterally    GENITOURINARY:  VULVAR:  Female external genitalia w/o any obvious lesions. Female hair distribution. Adequate perineal body. Normal urethral meatus. No gross lymphadenopathy.   VAGINA:  Well-rugated. Adequate support. No significant cystocele or rectocele. No obvious lesion. No discharge.  CERVIX:   Surgically absent. Vaginal cuff intact with no lesions or tenderness.     UTERUS:  Surgically absent.   ADNEXA:  No masses, non-tender   RECTAL:  Deferred. No obvious external lesions    Chaperone present for exam.    Assessment:     48 y.o.  with h/o total laparoscopic hysterectomy with bilateral salpingectomy:    Well woman gynecologic exam    Plan:    A gynecologic health assessment was performed with age appropriate counseling.    Cervical cancer screening - pap not clinically indicated.    Screening mammogram ordered, pt advised to call to schedule.    Refer for screening colonoscopy.      Encourage healthy lifestyle modifications, monthly self breast exams, and f/u with PCP for health maintenance.    Return 1 year for gynecologic exam or sooner as needed.  All questions answered, pt voiced understanding.        Aamir Lopez MD

## 2024-11-18 ENCOUNTER — HOSPITAL ENCOUNTER (EMERGENCY)
Facility: HOSPITAL | Age: 49
Discharge: HOME | End: 2024-11-18
Attending: STUDENT IN AN ORGANIZED HEALTH CARE EDUCATION/TRAINING PROGRAM
Payer: MEDICAID

## 2024-11-18 VITALS
DIASTOLIC BLOOD PRESSURE: 109 MMHG | HEART RATE: 101 BPM | RESPIRATION RATE: 20 BRPM | WEIGHT: 208 LBS | SYSTOLIC BLOOD PRESSURE: 169 MMHG | OXYGEN SATURATION: 99 % | BODY MASS INDEX: 33.57 KG/M2

## 2024-11-18 DIAGNOSIS — K06.8 GINGIVAL BLEEDING: Primary | ICD-10-CM

## 2024-11-18 DIAGNOSIS — R00.0 TACHYCARDIA: ICD-10-CM

## 2024-11-18 LAB
ABO + RH BLD: NORMAL
ALBUMIN SERPL BCP-MCNC: 3.8 G/DL (ref 3.5–5.2)
ALP SERPL-CCNC: 114 U/L (ref 40–150)
ALT SERPL W/O P-5'-P-CCNC: 15 U/L (ref 10–44)
ANION GAP SERPL CALC-SCNC: 10 MMOL/L (ref 8–16)
APTT PPP: 26.1 SEC (ref 21–32)
AST SERPL-CCNC: 21 U/L (ref 10–40)
BASOPHILS # BLD AUTO: 0.04 K/UL (ref 0–0.2)
BASOPHILS NFR BLD: 0.5 % (ref 0–1.9)
BILIRUB SERPL-MCNC: 0.5 MG/DL (ref 0.1–1)
BLD GP AB SCN CELLS X3 SERPL QL: NORMAL
BUN SERPL-MCNC: 11 MG/DL (ref 6–20)
CALCIUM SERPL-MCNC: 10 MG/DL (ref 8.7–10.5)
CHLORIDE SERPL-SCNC: 110 MMOL/L (ref 95–110)
CO2 SERPL-SCNC: 19 MMOL/L (ref 23–29)
CREAT SERPL-MCNC: 0.9 MG/DL (ref 0.5–1.4)
DIFFERENTIAL METHOD BLD: ABNORMAL
EOSINOPHIL # BLD AUTO: 0.1 K/UL (ref 0–0.5)
EOSINOPHIL NFR BLD: 1.1 % (ref 0–8)
ERYTHROCYTE [DISTWIDTH] IN BLOOD BY AUTOMATED COUNT: 13.5 % (ref 11.5–14.5)
EST. GFR  (NO RACE VARIABLE): >60 ML/MIN/1.73 M^2
GLUCOSE SERPL-MCNC: 100 MG/DL (ref 70–110)
HCT VFR BLD AUTO: 36.3 % (ref 37–48.5)
HGB BLD-MCNC: 11.7 G/DL (ref 12–16)
IMM GRANULOCYTES # BLD AUTO: 0.02 K/UL (ref 0–0.04)
IMM GRANULOCYTES NFR BLD AUTO: 0.2 % (ref 0–0.5)
INR PPP: 1 (ref 0.8–1.2)
LYMPHOCYTES # BLD AUTO: 2.5 K/UL (ref 1–4.8)
LYMPHOCYTES NFR BLD: 30.3 % (ref 18–48)
MCH RBC QN AUTO: 26.9 PG (ref 27–31)
MCHC RBC AUTO-ENTMCNC: 32.2 G/DL (ref 32–36)
MCV RBC AUTO: 83 FL (ref 82–98)
MONOCYTES # BLD AUTO: 0.7 K/UL (ref 0.3–1)
MONOCYTES NFR BLD: 8.8 % (ref 4–15)
NEUTROPHILS # BLD AUTO: 4.8 K/UL (ref 1.8–7.7)
NEUTROPHILS NFR BLD: 59.1 % (ref 38–73)
NRBC BLD-RTO: 0 /100 WBC
PLATELET # BLD AUTO: 200 K/UL (ref 150–450)
PMV BLD AUTO: 11.7 FL (ref 9.2–12.9)
POTASSIUM SERPL-SCNC: 3.9 MMOL/L (ref 3.5–5.1)
PROT SERPL-MCNC: 8.5 G/DL (ref 6–8.4)
PROTHROMBIN TIME: 10.6 SEC (ref 9–12.5)
RBC # BLD AUTO: 4.35 M/UL (ref 4–5.4)
SODIUM SERPL-SCNC: 139 MMOL/L (ref 136–145)
SPECIMEN OUTDATE: NORMAL
WBC # BLD AUTO: 8.09 K/UL (ref 3.9–12.7)

## 2024-11-18 PROCEDURE — 85610 PROTHROMBIN TIME: CPT

## 2024-11-18 PROCEDURE — 93005 ELECTROCARDIOGRAM TRACING: CPT

## 2024-11-18 PROCEDURE — 99284 EMERGENCY DEPT VISIT MOD MDM: CPT | Mod: 25

## 2024-11-18 PROCEDURE — 93010 ELECTROCARDIOGRAM REPORT: CPT | Mod: ,,, | Performed by: INTERNAL MEDICINE

## 2024-11-18 PROCEDURE — 25000003 PHARM REV CODE 250: Performed by: STUDENT IN AN ORGANIZED HEALTH CARE EDUCATION/TRAINING PROGRAM

## 2024-11-18 PROCEDURE — 85025 COMPLETE CBC W/AUTO DIFF WBC: CPT

## 2024-11-18 PROCEDURE — 85730 THROMBOPLASTIN TIME PARTIAL: CPT

## 2024-11-18 PROCEDURE — 80053 COMPREHEN METABOLIC PANEL: CPT

## 2024-11-18 PROCEDURE — 86901 BLOOD TYPING SEROLOGIC RH(D): CPT | Performed by: STUDENT IN AN ORGANIZED HEALTH CARE EDUCATION/TRAINING PROGRAM

## 2024-11-18 RX ADMIN — TRANEXAMIC ACID 3000 MG: 100 INJECTION, SOLUTION INTRAVENOUS at 07:11

## 2024-11-19 NOTE — ED PROVIDER NOTES
Encounter Date: 11/18/2024    SCRIBE #1 NOTE: I, Elisabet Smith, am scribing for, and in the presence of,  Nilton Simons MD. I have scribed the following portions of the note - Other sections scribed: HPI, ROS.       History     Chief Complaint   Patient presents with    Dental Injury     Pt presents to ED with complaint pt right upper dental bleeding since 1400 after dental cleaning. Pt denies having procedure done, and reports it was just a cleaning. Pt actively bleeding, bleeding controlled with gauze in mouth. Pt hypertensive in triage. Pt reports taking BP meds today. Unable to get oral temp due to pat actively bleeding      Nuria Franklin is a 49 y.o. female, with PMHx of anemia and HTN who presents to the ED complaining of right-sided dental bleeding since 1400 after regular dental cleaning. Pt denies having any invasive dental procedures done. Pt is actively bleeding, controlled with gauze in mouth. Pt reports taking blood pressure medication today. Pt denies history of  bleeding disorders or anticoagulant use. No alleviating or exacerbating factors. Denies any vaginal bleeding, vaginal discharge, or other associated symptoms.     The history is provided by the patient. No  was used.     Review of patient's allergies indicates:  No Known Allergies  Past Medical History:   Diagnosis Date    Anemia     Miscarriage     2013    Preeclampsia 10/6/2014     Past Surgical History:   Procedure Laterality Date    DILATION AND CURETTAGE OF UTERUS      HYSTERECTOMY       No family history on file.  Social History     Tobacco Use    Smoking status: Never    Smokeless tobacco: Never   Substance Use Topics    Alcohol use: No    Drug use: No     Review of Systems   Constitutional:  Negative for chills and fever.   HENT:  Positive for dental problem (dental bleeding). Negative for congestion, rhinorrhea and sore throat.    Eyes:  Negative for visual disturbance.   Respiratory:  Negative for  cough and shortness of breath.    Cardiovascular:  Negative for chest pain.   Gastrointestinal:  Negative for abdominal pain, diarrhea, nausea and vomiting.   Genitourinary:  Negative for dysuria, frequency, hematuria, vaginal bleeding and vaginal discharge.   Musculoskeletal:  Negative for back pain.   Skin:  Negative for rash.   Neurological:  Negative for dizziness, weakness and headaches.   Hematological:  Does not bruise/bleed easily.       Physical Exam     Initial Vitals [11/18/24 1834]   BP Pulse Resp Temp SpO2   (!) 207/120 (!) 118 20 -- 100 %      MAP       --         Physical Exam    Nursing note and vitals reviewed.  Constitutional: She appears well-developed and well-nourished.  Non-toxic appearance. She does not appear ill.   HENT:   Head: Normocephalic and atraumatic. Mouth/Throat: Mucous membranes are normal.   Active gingival bleeding from the right sided top and bottom   Eyes: EOM are normal.   Neck: Neck supple.   Cardiovascular:  Normal rate and regular rhythm.           Pulmonary/Chest: Effort normal and breath sounds normal. No respiratory distress.   Abdominal: Abdomen is soft. Bowel sounds are normal. She exhibits no distension. There is no abdominal tenderness.   Musculoskeletal:         General: Normal range of motion.      Cervical back: Neck supple.     Neurological: She is alert.   Skin: Skin is warm and dry.   Psychiatric: She has a normal mood and affect.         ED Course   Procedures  Labs Reviewed   CBC W/ AUTO DIFFERENTIAL - Abnormal       Result Value    WBC 8.09      RBC 4.35      Hemoglobin 11.7 (*)     Hematocrit 36.3 (*)     MCV 83      MCH 26.9 (*)     MCHC 32.2      RDW 13.5      Platelets 200      MPV 11.7      Immature Granulocytes 0.2      Gran # (ANC) 4.8      Immature Grans (Abs) 0.02      Lymph # 2.5      Mono # 0.7      Eos # 0.1      Baso # 0.04      nRBC 0      Gran % 59.1      Lymph % 30.3      Mono % 8.8      Eosinophil % 1.1      Basophil % 0.5      Differential  Method Automated     COMPREHENSIVE METABOLIC PANEL - Abnormal    Sodium 139      Potassium 3.9      Chloride 110      CO2 19 (*)     Glucose 100      BUN 11      Creatinine 0.9      Calcium 10.0      Total Protein 8.5 (*)     Albumin 3.8      Total Bilirubin 0.5      Alkaline Phosphatase 114      AST 21      ALT 15      eGFR >60      Anion Gap 10     PROTIME-INR    Prothrombin Time 10.6      INR 1.0     APTT    aPTT 26.1     TYPE & SCREEN    Group & Rh O POS      Indirect Alfie NEG      Specimen Outdate 11/21/2024 23:59            Imaging Results    None          Medications   tranexamic acid (CYKLOKAPRON) 3,000 mg in 0.9% NaCl SolP 100 mL (3,000 mg Irrigation Given by Provider 11/18/24 1919)     Medical Decision Making  Amount and/or Complexity of Data Reviewed  Labs: ordered. Decision-making details documented in ED Course.  ECG/medicine tests: ordered and independent interpretation performed. Decision-making details documented in ED Course.    Tachycardic to 115, BP 150s/100s  Vitals are otherwise unremarkable   Patient has active gingival bleeding   TXA soaked gauze applied to the top and bottom gingiva  Progressively the patient's bleeding resolved   When hemoglobin he was 11   PT and PTT a within normal limits  Platelets within normal limits   Patient may have another bleeding along the lines of von Willebrand's; she needs have workup on an outpatient basis  Her heart rate improved in her blood pressure remained stable   Patient's symptoms overall improved   Patient discharged with a referral to hematology for further outpatient workup  Review strict return precautions for worsening of her bleeding    I discussed with the patient/family the diagnosis, treatment plan, indications for return to the emergency department, and for expected follow-up. The patient/family verbalized an understanding. The patient/family is asked if there are any questions or concerns. We discuss the case, until all issues are  addressed to the patient/familys satisfaction. Patient/family understands and is agreeable to the plan.   Nilton Simons    DISCLAIMER: This note was prepared with [employer] voice recognition transcription software. Garbled syntax, mangled pronouns, and other bizarre constructions may be attributed to that software system.          Scribe Attestation:   Scribe #1: I performed the above scribed service and the documentation accurately describes the services I performed. I attest to the accuracy of the note.                             I, Nilton Simons MD, personally performed the services described in this documentation. All medical record entries made by the scribe were at my direction and in my presence. I have reviewed the chart and agree that the record reflects my personal performance and is accurate and complete.      DISCLAIMER: This note was prepared with [employer] voice recognition transcription software. Garbled syntax, mangled pronouns, and other bizarre constructions may be attributed to that software system.    Clinical Impression:  Final diagnoses:  [R00.0] Tachycardia  [K06.8] Gingival bleeding (Primary)          ED Disposition Condition    Discharge Stable          ED Prescriptions    None       Follow-up Information    None          Nilton Simons MD  11/19/24 0235

## 2024-11-19 NOTE — DISCHARGE INSTRUCTIONS
You should return if you have reoccurrence of her bleeding.  You should otherwise follow up with Hematology and Oncology.  You have previously seen Richardson Farmer MD at [facility].  You can reach out to this physician's clinic to reschedule follow up if he would like.  I also placed a referral with the [facility] system.  You can keep this appointment if you would prefer to follow up in our system.  Thank you.

## 2024-11-20 LAB
OHS QRS DURATION: 84 MS
OHS QTC CALCULATION: 463 MS

## 2025-01-27 ENCOUNTER — OFFICE VISIT (OUTPATIENT)
Dept: HEMATOLOGY/ONCOLOGY | Facility: CLINIC | Age: 50
End: 2025-01-27
Payer: MEDICAID

## 2025-01-27 ENCOUNTER — LAB VISIT (OUTPATIENT)
Dept: LAB | Facility: HOSPITAL | Age: 50
End: 2025-01-27
Attending: INTERNAL MEDICINE
Payer: MEDICAID

## 2025-01-27 VITALS
BODY MASS INDEX: 34.12 KG/M2 | DIASTOLIC BLOOD PRESSURE: 90 MMHG | HEART RATE: 82 BPM | OXYGEN SATURATION: 100 % | SYSTOLIC BLOOD PRESSURE: 145 MMHG | WEIGHT: 212.31 LBS | HEIGHT: 66 IN

## 2025-01-27 DIAGNOSIS — K06.8 GINGIVAL BLEEDING: ICD-10-CM

## 2025-01-27 DIAGNOSIS — D50.9 IRON DEFICIENCY ANEMIA, UNSPECIFIED IRON DEFICIENCY ANEMIA TYPE: Primary | ICD-10-CM

## 2025-01-27 DIAGNOSIS — R58 MUCOSAL BLEEDING: Primary | ICD-10-CM

## 2025-01-27 DIAGNOSIS — D64.9 ANEMIA, UNSPECIFIED TYPE: ICD-10-CM

## 2025-01-27 DIAGNOSIS — R58 MUCOSAL BLEEDING: ICD-10-CM

## 2025-01-27 LAB
APTT PPP: 26 SEC (ref 21–32)
BASOPHILS # BLD AUTO: 0.03 K/UL (ref 0–0.2)
BASOPHILS NFR BLD: 0.4 % (ref 0–1.9)
DIFFERENTIAL METHOD BLD: ABNORMAL
EOSINOPHIL # BLD AUTO: 0.1 K/UL (ref 0–0.5)
EOSINOPHIL NFR BLD: 1 % (ref 0–8)
ERYTHROCYTE [DISTWIDTH] IN BLOOD BY AUTOMATED COUNT: 14.7 % (ref 11.5–14.5)
FERRITIN SERPL-MCNC: 9 NG/ML (ref 20–300)
FIBRINOGEN PPP-MCNC: 365 MG/DL (ref 182–400)
HCT VFR BLD AUTO: 29.8 % (ref 37–48.5)
HGB BLD-MCNC: 8.9 G/DL (ref 12–16)
IMM GRANULOCYTES # BLD AUTO: 0.02 K/UL (ref 0–0.04)
IMM GRANULOCYTES NFR BLD AUTO: 0.3 % (ref 0–0.5)
INR PPP: 1 (ref 0.8–1.2)
IRON SERPL-MCNC: 31 UG/DL (ref 30–160)
LYMPHOCYTES # BLD AUTO: 2.2 K/UL (ref 1–4.8)
LYMPHOCYTES NFR BLD: 30.2 % (ref 18–48)
MCH RBC QN AUTO: 23.4 PG (ref 27–31)
MCHC RBC AUTO-ENTMCNC: 29.9 G/DL (ref 32–36)
MCV RBC AUTO: 78 FL (ref 82–98)
MONOCYTES # BLD AUTO: 0.6 K/UL (ref 0.3–1)
MONOCYTES NFR BLD: 7.6 % (ref 4–15)
NEUTROPHILS # BLD AUTO: 4.4 K/UL (ref 1.8–7.7)
NEUTROPHILS NFR BLD: 60.5 % (ref 38–73)
NRBC BLD-RTO: 0 /100 WBC
PLATELET # BLD AUTO: 229 K/UL (ref 150–450)
PMV BLD AUTO: 11.1 FL (ref 9.2–12.9)
PROTHROMBIN TIME: 10.9 SEC (ref 9–12.5)
RBC # BLD AUTO: 3.8 M/UL (ref 4–5.4)
SATURATED IRON: 6 % (ref 20–50)
TOTAL IRON BINDING CAPACITY: 503 UG/DL (ref 250–450)
TRANSFERRIN SERPL-MCNC: 340 MG/DL (ref 200–375)
WBC # BLD AUTO: 7.23 K/UL (ref 3.9–12.7)

## 2025-01-27 PROCEDURE — 85610 PROTHROMBIN TIME: CPT | Performed by: INTERNAL MEDICINE

## 2025-01-27 PROCEDURE — 85245 CLOT FACTOR VIII VW RISTOCTN: CPT | Performed by: INTERNAL MEDICINE

## 2025-01-27 PROCEDURE — 85025 COMPLETE CBC W/AUTO DIFF WBC: CPT | Performed by: INTERNAL MEDICINE

## 2025-01-27 PROCEDURE — 85390 FIBRINOLYSINS SCREEN I&R: CPT | Mod: 91 | Performed by: INTERNAL MEDICINE

## 2025-01-27 PROCEDURE — 84466 ASSAY OF TRANSFERRIN: CPT | Performed by: INTERNAL MEDICINE

## 2025-01-27 PROCEDURE — 85730 THROMBOPLASTIN TIME PARTIAL: CPT | Performed by: INTERNAL MEDICINE

## 2025-01-27 PROCEDURE — 82728 ASSAY OF FERRITIN: CPT | Performed by: INTERNAL MEDICINE

## 2025-01-27 PROCEDURE — 85290 CLOT FACTOR XIII FIBRIN STAB: CPT | Performed by: INTERNAL MEDICINE

## 2025-01-27 PROCEDURE — 99213 OFFICE O/P EST LOW 20 MIN: CPT | Mod: PBBFAC | Performed by: INTERNAL MEDICINE

## 2025-01-27 PROCEDURE — 99999 PR PBB SHADOW E&M-EST. PATIENT-LVL III: CPT | Mod: PBBFAC,,, | Performed by: INTERNAL MEDICINE

## 2025-01-27 PROCEDURE — 85390 FIBRINOLYSINS SCREEN I&R: CPT | Performed by: INTERNAL MEDICINE

## 2025-01-27 PROCEDURE — 36415 COLL VENOUS BLD VENIPUNCTURE: CPT | Performed by: INTERNAL MEDICINE

## 2025-01-27 PROCEDURE — 85384 FIBRINOGEN ACTIVITY: CPT | Performed by: INTERNAL MEDICINE

## 2025-01-27 PROCEDURE — 3080F DIAST BP >= 90 MM HG: CPT | Mod: CPTII,,, | Performed by: INTERNAL MEDICINE

## 2025-01-27 PROCEDURE — 85247 CLOT FACTOR VIII MULTIMETRIC: CPT | Performed by: INTERNAL MEDICINE

## 2025-01-27 PROCEDURE — 3008F BODY MASS INDEX DOCD: CPT | Mod: CPTII,,, | Performed by: INTERNAL MEDICINE

## 2025-01-27 PROCEDURE — 3077F SYST BP >= 140 MM HG: CPT | Mod: CPTII,,, | Performed by: INTERNAL MEDICINE

## 2025-01-27 PROCEDURE — 99205 OFFICE O/P NEW HI 60 MIN: CPT | Mod: S$PBB,,, | Performed by: INTERNAL MEDICINE

## 2025-01-27 RX ORDER — HEPARIN 100 UNIT/ML
500 SYRINGE INTRAVENOUS
OUTPATIENT
Start: 2025-02-04

## 2025-01-27 RX ORDER — EPINEPHRINE 0.3 MG/.3ML
0.3 INJECTION SUBCUTANEOUS ONCE AS NEEDED
OUTPATIENT
Start: 2025-02-04

## 2025-01-27 RX ORDER — DIPHENHYDRAMINE HYDROCHLORIDE 50 MG/ML
50 INJECTION INTRAMUSCULAR; INTRAVENOUS ONCE AS NEEDED
OUTPATIENT
Start: 2025-02-04

## 2025-01-27 RX ORDER — EPINEPHRINE 0.3 MG/.3ML
0.3 INJECTION SUBCUTANEOUS ONCE AS NEEDED
OUTPATIENT
Start: 2025-02-07

## 2025-01-27 RX ORDER — SODIUM CHLORIDE 0.9 % (FLUSH) 0.9 %
10 SYRINGE (ML) INJECTION
OUTPATIENT
Start: 2025-02-04

## 2025-01-27 RX ORDER — DIPHENHYDRAMINE HYDROCHLORIDE 50 MG/ML
50 INJECTION INTRAMUSCULAR; INTRAVENOUS ONCE AS NEEDED
OUTPATIENT
Start: 2025-02-07

## 2025-01-27 RX ORDER — HEPARIN 100 UNIT/ML
500 SYRINGE INTRAVENOUS
OUTPATIENT
Start: 2025-02-07

## 2025-01-27 RX ORDER — SODIUM CHLORIDE 0.9 % (FLUSH) 0.9 %
10 SYRINGE (ML) INJECTION
OUTPATIENT
Start: 2025-02-07

## 2025-01-27 NOTE — PROGRESS NOTES
"Subjective     Patient ID: Nuria Franklin is a 49 y.o. female.    Chief Complaint: New patient   (New patient )  Reason For Consultation: Gingival bleeding   HPI     Chief complaint:   Mucosal bleeding     HPI:    Ms Nuria Barboza is a 49 F with NICCI and HTN  seen for evaluation for for evaluation of NICCI with possible bleeding disorder. She is here  with daughter  helping with HPI. She is lost to follow up with  by Dr. Farmer of [facility]. Last follow up 2022.      Patient states that she did not start having true heavy bleeding until after giving birth to her 6th child almost 8 years ago. Prior to her last childbirth, she had regular periods every 4 weeks that lasted approximately 5 days. The first two days would be heavier, followed by spotting the last 3 days. After the birth of her 6th child, her periods started lasting at least 2 weeks with clots, pads needing to be changed every 20 minutes, and associated fatigue. She was found to have fibroids and was started on Mirena IUD by her OBGYN which did not help with the bleeding at all.      After failing trial of Mirena, patient underwent a laparoscopic hysterectomy in 2018. She had delayed bleeding 5 hours after surgery that required a blood transfusion and then discharged home. 11 days after going home from the surgery, she started having heavy bleeding again. She required multiple units of PRBCs and FFP and underwent an exploratory laparotomy where it was noted that "all the pedicles inside were oozing" and that "she also had a pumping coming in the right side of the vaginal cuff" per [facility] OR report. During this surgery, she was also given amicar.      After this surgery, she had no further episodes of bleeding. She was evaluated by another hematologist who screened her for VWD and tested coagulation studies which all resulted normal. Labs not available for review. She was prescribed PO iron " "supplementation which she has been on ever since.       She thinks she  underwent  EGD or colonoscopy but is uncertain of details. She has undergone dental extractions and may need dental cleaning/procedure more in near future.       She reports ongoing mouth bleed sometimes spont at night.  Has not started on TXA yet.  She denies any other bleeding complaints, except for excessive bruising.  Previous history:  Patient was referred to hematology at discharge from [facility] where she was admitted for gum bleeding resulting in symptomatic anemia with hgb of 6.8. She required 2U PRBC total during admission. She was evaluated by GI during admission who felt that anemia could be accounted for by the large amount of gum bleeding she had, but she has been scheduled for an outpatient EGD/colonoscopy to complete workup in 5/2022.      Patient states that she did not start having true heavy bleeding until after giving birth to her 6th child almost 8 years ago. Prior to her last childbirth, she had regular periods every 4 weeks that lasted approximately 5 days. The first two days would be heavier, followed by spotting the last 3 days. After the birth of her 6th child, her periods started lasting at least 2 weeks with clots, pads needing to be changed every 20 minutes, and associated fatigue. She was found to have fibroids and was started on Mirena IUD by her OBGYN which did not help with the bleeding at all.      After failing trial of Mirena, patient underwent a laparoscopic hysterectomy in 2018. She had delayed bleeding 5 hours after surgery that required a blood transfusion and then discharged home. 11 days after going home from the surgery, she started having heavy bleeding again. She required multiple units of PRBCs and FFP and underwent an exploratory laparotomy where it was noted that "all the pedicles inside were oozing" and that "she also had a pumping coming in the right side of the vaginal cuff" per [facility] OR report. " During this surgery, she was also given amicar.      After this surgery, she had no further episodes of bleeding. She was evaluated by another hematologist who screened her for VWD and tested coagulation studies which all resulted normal. She was prescribed PO iron supplementation which she has been on ever since.      Her next episode of bleeding was not until the gum bleeding that resulted in hospitalization with blood transfusion at [facility] in 12/2021. Gum bleed for 8 days (intermittent then continuous) received 2 units PRBC (first unit did not improve Hgb, stayed at 6.8) She stated she had woken up with a pool of blood on her pillow. Noted that at time of hospitalization, patient had been evaluated by a dentist who prescribed an antibiotic and ibuprofen for planned teeth extraction. She has since had two teeth pulled since hospitalization without any bleeding complications since that time.     She reports she last  underwent tooth extractions June 2024  She visited ED  November 2024 after she underwent deep dental cleaning 11/2024  and developed right-sided dental bleeding since for 4 hours  after  dental cleaning.  Hemoglobin he was 11 . PT and PTT a within normal limits  Platelets within normal limits      Patient denies any blood in urine or stool. No history of nose bleeds. Reports easy bruising (normally after bumping into something). Bruises are flat and take days to go away.        She does not recall nosebleeds but positive for oral bleeds-hospital stay with transfusion. Seven pregnancies with 6 vag deliveries (one miscarriage); denies any post-partum heavy bleeding. Reports that heavy bleeding started after 5th delivery.   She reports one daughter with heavy irregular cycles, one with sever nosebleed that required ED interventions and one son with nose bleeds.   Patient poor historian Daughter provides most of history.     She was prescribed Tranexamic acid 2 pills TID in 2022 per Dr. Farmer   She reports  "she last took pill December 15th ? 2023 Although uncertain of details.     She reports she still has intermittent oral bleeds at night not as heavy. Last prbc transfusion 2021  She takes Fe spp intermittently . She last took Fe supp December 2023.     Denies fam hx of CTD. No arthralgias or joint swelling. No rashes    She is here for further evaluation.     PMHX: HTN, anemia, preeclampsia,miscarirage        Past Surgical History:   Procedure Laterality Date    DILATION AND CURETTAGE OF UTERUS      HYSTERECTOMY          Social History     Tobacco Use    Smoking status: Never    Smokeless tobacco: Never   Substance Use Topics    Alcohol use: No    Drug use: No        Review of Systems   Constitutional:  Negative for appetite change, fatigue, fever and unexpected weight change.   HENT:  Negative for mouth sores.    Eyes:  Negative for visual disturbance.   Respiratory:  Negative for cough and shortness of breath.    Cardiovascular:  Negative for chest pain.   Gastrointestinal:  Negative for abdominal pain and diarrhea.   Genitourinary:  Negative for frequency.   Musculoskeletal:  Negative for back pain.   Integumentary:  Negative for rash.   Neurological:  Negative for headaches.   Hematological:  Negative for adenopathy.   Psychiatric/Behavioral:  The patient is not nervous/anxious.           Objective   Vitals:    01/27/25 1307   BP: (!) 145/90   BP Location: Right arm   Patient Position: Sitting   Pulse: 82   SpO2: 100%   Weight: 96.3 kg (212 lb 4.9 oz)   Height: 5' 6" (1.676 m)       Physical Exam  Constitutional:       Appearance: She is well-developed.   HENT:      Head: Normocephalic.      Mouth/Throat:      Pharynx: No oropharyngeal exudate.   Eyes:      General: No scleral icterus.        Right eye: No discharge.         Left eye: No discharge.      Conjunctiva/sclera: Conjunctivae normal.   Cardiovascular:      Rate and Rhythm: Normal rate and regular rhythm.      Heart sounds: Normal heart sounds. No murmur " heard.  Pulmonary:      Effort: Pulmonary effort is normal.      Breath sounds: Normal breath sounds. No wheezing or rales.   Abdominal:      General: Bowel sounds are normal.      Palpations: Abdomen is soft.      Tenderness: There is no abdominal tenderness. There is no guarding or rebound.   Musculoskeletal:         General: Normal range of motion.      Cervical back: Normal range of motion and neck supple.      Right lower leg: No edema.      Left lower leg: No edema.   Skin:     Coloration: Skin is not jaundiced.      Findings: No rash.   Neurological:      General: No focal deficit present.      Mental Status: She is alert and oriented to person, place, and time.   Psychiatric:         Mood and Affect: Mood normal.         Behavior: Behavior normal.       Lab Results   Component Value Date    WBC 7.23 01/27/2025    HGB 8.9 (L) 01/27/2025    HCT 29.8 (L) 01/27/2025    MCV 78 (L) 01/27/2025     01/27/2025            Assessment and Plan       1. Gingival bleeding  Plan appropriate lab testing   -     Ambulatory referral/consult to Hematology / Oncology  -     CBC Auto Differential; Future; Expected date: 01/27/2025  -     Ferritin; Future; Expected date: 01/27/2025  -     Iron and TIBC; Future; Expected date: 01/27/2025  -     Protime-INR; Future; Expected date: 01/27/2025  -     APTT; Future; Expected date: 01/27/2025  -     von Willebrand Profile; Future; Expected date: 01/27/2025  -     Cancel: PFA-100; Future; Expected date: 01/27/2025  -     FIBRINOGEN; Future; Expected date: 01/27/2025  -     FACTOR 13 ACTIVITY; Future; Expected date: 01/27/2025  -     Misc Sendout Test, Blood GP1bM   assay; Future; Expected date: 01/27/2025    2 Anemia, unspecified type  -     CBC Auto Differential; Future; Expected date: 01/27/2025  -     Ferritin; Future; Expected date: 01/27/2025  -     Iron and TIBC; Future; Expected date: 01/27/2025        Follow up in 1 month    Addendum: Labs show NICCI. Plan IV Fe

## 2025-01-28 ENCOUNTER — TELEPHONE (OUTPATIENT)
Dept: HEMATOLOGY/ONCOLOGY | Facility: CLINIC | Age: 50
End: 2025-01-28
Payer: MEDICAID

## 2025-01-28 NOTE — TELEPHONE ENCOUNTER
Tc to pt to advise her per Dr Khan that she will  need iron infusions and that she will be contacted by the infusion center with a date and time  She agreed

## 2025-01-31 LAB
FACT VIII ACT/NOR PPP: 121 % (ref 55–200)
VON WILLEBRAND EVAL PPP-IMP: ABNORMAL
VWF AG ACT/NOR PPP IA: 61 % (ref 55–200)
VWF:AC ACT/NOR PPP IA: 35 % (ref 55–200)

## 2025-02-04 ENCOUNTER — INFUSION (OUTPATIENT)
Dept: INFUSION THERAPY | Facility: HOSPITAL | Age: 50
End: 2025-02-04
Payer: MEDICAID

## 2025-02-04 VITALS
DIASTOLIC BLOOD PRESSURE: 78 MMHG | OXYGEN SATURATION: 99 % | HEART RATE: 97 BPM | TEMPERATURE: 99 F | SYSTOLIC BLOOD PRESSURE: 131 MMHG | RESPIRATION RATE: 18 BRPM

## 2025-02-04 DIAGNOSIS — D50.9 IRON DEFICIENCY ANEMIA, UNSPECIFIED IRON DEFICIENCY ANEMIA TYPE: Primary | ICD-10-CM

## 2025-02-04 PROCEDURE — 96365 THER/PROPH/DIAG IV INF INIT: CPT

## 2025-02-04 PROCEDURE — 25000003 PHARM REV CODE 250: Performed by: INTERNAL MEDICINE

## 2025-02-04 PROCEDURE — 63600175 PHARM REV CODE 636 W HCPCS: Performed by: INTERNAL MEDICINE

## 2025-02-04 PROCEDURE — 96375 TX/PRO/DX INJ NEW DRUG ADDON: CPT

## 2025-02-04 RX ORDER — DIPHENHYDRAMINE HYDROCHLORIDE 50 MG/ML
INJECTION INTRAMUSCULAR; INTRAVENOUS
Status: DISCONTINUED
Start: 2025-02-04 | End: 2025-02-04 | Stop reason: HOSPADM

## 2025-02-04 RX ORDER — DIPHENHYDRAMINE HYDROCHLORIDE 50 MG/ML
50 INJECTION INTRAMUSCULAR; INTRAVENOUS ONCE AS NEEDED
Status: COMPLETED | OUTPATIENT
Start: 2025-02-04 | End: 2025-02-04

## 2025-02-04 RX ADMIN — DIPHENHYDRAMINE HYDROCHLORIDE 50 MG: 50 INJECTION INTRAMUSCULAR; INTRAVENOUS at 11:02

## 2025-02-04 RX ADMIN — IRON SUCROSE 300 MG: 20 INJECTION, SOLUTION INTRAVENOUS at 10:02

## 2025-02-04 NOTE — PLAN OF CARE
Pt arrived to clinic by foot without difficulty, AAOx4, accompanied by daughter. Pt speaks and understands some english; refused . Primary language [place]ian Creole. Extensive pt education provided to pt and daughter about Venofer. Pt and daughter amenable. Pt received Venofer 300mg infusion via 24g R FA. Dsg c/d/i; no s/s of infection or infiltration noted. PIV flushed and patent with blood return. Pt reported mild itching to R UA during infusion. Denied any other symptoms. Pt instructed to notify staff if itching worsens or any other symptoms arise. Pt given 50mg of benadryl. Instructed pt and daughter dose given for precautionary measures and that medication will cause drowsiness. Pt verbalized understanding and stated daughter will be driving her home. Pt tolerated remaninig infusion well. No S&S of an adverse reaction, VSS during and post infusion observation period. Denies pain or discomfort. Care plan discussed with pt. Pt verbalized understanding. Printed copy of appointment schedule provided. Pt ambulatory upon discharge in [facility].          Problem: Anemia  Goal: Anemia Symptom Improvement  Outcome: Progressing     Problem: Fatigue  Goal: Improved Activity Tolerance  Outcome: Progressing     Problem: Fall Injury Risk  Goal: Absence of Fall and Fall-Related Injury  Outcome: Progressing

## 2025-02-06 LAB — FACT XIIIA PPP-ACNC: 149 %

## 2025-02-07 ENCOUNTER — INFUSION (OUTPATIENT)
Dept: INFUSION THERAPY | Facility: HOSPITAL | Age: 50
End: 2025-02-07
Attending: INTERNAL MEDICINE
Payer: MEDICAID

## 2025-02-07 VITALS
SYSTOLIC BLOOD PRESSURE: 134 MMHG | OXYGEN SATURATION: 99 % | HEART RATE: 70 BPM | TEMPERATURE: 99 F | DIASTOLIC BLOOD PRESSURE: 80 MMHG | RESPIRATION RATE: 17 BRPM

## 2025-02-07 DIAGNOSIS — D50.9 IRON DEFICIENCY ANEMIA, UNSPECIFIED IRON DEFICIENCY ANEMIA TYPE: Primary | ICD-10-CM

## 2025-02-07 LAB
PROVIDER SIGNING NAME: NORMAL
VON WILLEBRAND EVAL PPP-IMP: NORMAL
VON WILLEBRAND EVAL PPP-IMP: NORMAL
VWF MULTIMERS PPP QL: NORMAL
VWF:RCO ACT/NOR PPP PL AGG: 30 % (ref 55–200)

## 2025-02-07 PROCEDURE — 25000003 PHARM REV CODE 250: Performed by: INTERNAL MEDICINE

## 2025-02-07 PROCEDURE — 96366 THER/PROPH/DIAG IV INF ADDON: CPT

## 2025-02-07 PROCEDURE — 63600175 PHARM REV CODE 636 W HCPCS: Performed by: INTERNAL MEDICINE

## 2025-02-07 PROCEDURE — 96365 THER/PROPH/DIAG IV INF INIT: CPT

## 2025-02-07 RX ADMIN — IRON SUCROSE 300 MG: 20 INJECTION, SOLUTION INTRAVENOUS at 12:02

## 2025-02-07 NOTE — PLAN OF CARE
Pt arrived to the Infusion unit for IV medication. Pt alert and oriented x4, ambulates independently with steady gait. Pt reports feeling fatigued, otherwise, no new symptoms or concerns at this time. Pt consents to plan of care for today. Pt administered Venofer 300mg in 0.9% NaCl 250mL at 166.7mL/hr via 22G in (L) FA. Pt tolerated infusion well with no adverse reactions. Pt aware of next scheduled appointment and verbalizes no additional needs at this time. Pt ambulatory upon discharge in no acute distress.      Problem: Fatigue  Goal: Improved Activity Tolerance  Outcome: Progressing    Problem: Anemia  Goal: Anemia Symptom Improvement  Outcome: Progressing

## 2025-02-11 ENCOUNTER — INFUSION (OUTPATIENT)
Dept: INFUSION THERAPY | Facility: HOSPITAL | Age: 50
End: 2025-02-11
Attending: INTERNAL MEDICINE
Payer: MEDICAID

## 2025-02-11 VITALS
OXYGEN SATURATION: 99 % | SYSTOLIC BLOOD PRESSURE: 135 MMHG | TEMPERATURE: 99 F | HEART RATE: 86 BPM | RESPIRATION RATE: 18 BRPM | DIASTOLIC BLOOD PRESSURE: 79 MMHG

## 2025-02-11 DIAGNOSIS — D50.9 IRON DEFICIENCY ANEMIA, UNSPECIFIED IRON DEFICIENCY ANEMIA TYPE: Primary | ICD-10-CM

## 2025-02-11 LAB — ARUP MISCELLANEOUS TEST 1: ABNORMAL

## 2025-02-11 PROCEDURE — 96365 THER/PROPH/DIAG IV INF INIT: CPT

## 2025-02-11 PROCEDURE — 25000003 PHARM REV CODE 250

## 2025-02-11 PROCEDURE — 63600175 PHARM REV CODE 636 W HCPCS

## 2025-02-11 RX ORDER — SODIUM CHLORIDE 0.9 % (FLUSH) 0.9 %
10 SYRINGE (ML) INJECTION
Status: DISCONTINUED | OUTPATIENT
Start: 2025-02-11 | End: 2025-02-11 | Stop reason: HOSPADM

## 2025-02-11 NOTE — PLAN OF CARE
Arrived on unit ambulatory with steady gait.  VSS, Denies pain.  PIV started L FA with #24 jelco.  Venofer 300mg IV per pump started to run over 1.5 hrs.  Tolerated well. IV D/C after infusion.  Dressing applied to site. Left per self in NAD.  Patient given printed appt schedule.

## 2025-02-14 ENCOUNTER — INFUSION (OUTPATIENT)
Dept: INFUSION THERAPY | Facility: HOSPITAL | Age: 50
End: 2025-02-14
Attending: INTERNAL MEDICINE
Payer: MEDICAID

## 2025-02-14 VITALS
SYSTOLIC BLOOD PRESSURE: 148 MMHG | TEMPERATURE: 98 F | HEART RATE: 82 BPM | OXYGEN SATURATION: 100 % | RESPIRATION RATE: 16 BRPM | DIASTOLIC BLOOD PRESSURE: 80 MMHG

## 2025-02-14 DIAGNOSIS — D50.9 IRON DEFICIENCY ANEMIA, UNSPECIFIED IRON DEFICIENCY ANEMIA TYPE: Primary | ICD-10-CM

## 2025-02-14 PROCEDURE — 96365 THER/PROPH/DIAG IV INF INIT: CPT

## 2025-02-14 NOTE — PLAN OF CARE
Pt arrived to unit ambulatory, alert, and oriented. Pt received IV Venofer 300mg over 90 minutes with no S&S of complications. Pt discharged off unit in NAD.

## 2025-06-23 ENCOUNTER — HOSPITAL ENCOUNTER (OUTPATIENT)
Dept: RADIOLOGY | Facility: HOSPITAL | Age: 50
Discharge: HOME | End: 2025-06-23
Attending: NURSE PRACTITIONER
Payer: MEDICAID

## 2025-06-23 DIAGNOSIS — Z12.31 ENCOUNTER FOR SCREENING MAMMOGRAM FOR MALIGNANT NEOPLASM OF BREAST: ICD-10-CM

## 2025-06-23 PROCEDURE — 77067 SCR MAMMO BI INCL CAD: CPT | Mod: 26,,, | Performed by: RADIOLOGY

## 2025-06-23 PROCEDURE — 77063 BREAST TOMOSYNTHESIS BI: CPT | Mod: 26,,, | Performed by: RADIOLOGY

## 2025-06-23 PROCEDURE — 77063 BREAST TOMOSYNTHESIS BI: CPT | Mod: TC

## (undated) DEVICE — SEE MEDLINE ITEM 157181

## (undated) DEVICE — SPONGE LAP 18X18 PREWASHED

## (undated) DEVICE — Device

## (undated) DEVICE — POWDER ARISTA AH 3G

## (undated) DEVICE — SUT CTD VICRYL 0 UND BR CT

## (undated) DEVICE — SUT JJ41G O VICRYL

## (undated) DEVICE — TROCAR ENDOPATH XCEL 11MM 10CM

## (undated) DEVICE — BLANKET UPPER BODY 78.7X29.9IN

## (undated) DEVICE — DRESSING MEPORE PRO 6 X 7 CM

## (undated) DEVICE — DRAPE UNDER BUTTOCKS WITH POUCH

## (undated) DEVICE — CHLORAPREP W TINT 26ML APPL

## (undated) DEVICE — SOL NS 1000CC

## (undated) DEVICE — ADAPTER DISP HAND SWITCH

## (undated) DEVICE — SEE MEDLINE ITEM 154981

## (undated) DEVICE — ELECTRODE REM PLYHSV RETURN 9

## (undated) DEVICE — DEVICE N-SEAL LAPROSCOPIC

## (undated) DEVICE — SEE MEDLINE ITEM 152622

## (undated) DEVICE — SUT CTD VICRYL 3-0 PS-2 UND

## (undated) DEVICE — SOL 9P NACL IRR PIC IL

## (undated) DEVICE — SCISSOR CURVED ENDOPATH 5MM

## (undated) DEVICE — SEE MEDLINE ITEM 152487

## (undated) DEVICE — SYR 10CC LUER LOCK

## (undated) DEVICE — UNDERGLOVES BIOGEL PI SZ 6 LF

## (undated) DEVICE — TRAY FOLEY 16FR INFECTION CONT

## (undated) DEVICE — COVER MAYO STAND REINFRCD 30

## (undated) DEVICE — STRIP STERI REIN CLSR 1/2X2IN

## (undated) DEVICE — TIP RUMI GREEN DISPOSABLE6.7MM

## (undated) DEVICE — SEE MEDLINE ITEM 146417

## (undated) DEVICE — COVER OVERHEAD SURG LT BLUE

## (undated) DEVICE — CLOSURE SKIN STERI STRIP 1/2X4

## (undated) DEVICE — SUT 3-0 CTD VICRYL 27IN PS

## (undated) DEVICE — PAD SANITARY OB STERILE

## (undated) DEVICE — PACK ENDOSCOPY GENERAL

## (undated) DEVICE — SUPPORT ULNA NERVE PROTECTOR

## (undated) DEVICE — CANISTER SUCTION 2 LTR

## (undated) DEVICE — SPONGE GAUZE 16PLY 4X4

## (undated) DEVICE — DRESSING ADH ISLAND 2.5 X 3

## (undated) DEVICE — OCCLUDER COLPO-PNEUMO STERILE

## (undated) DEVICE — STAPLER SKIN SUBCUTICULAR

## (undated) DEVICE — DISSECTOR CURVED 5DCD

## (undated) DEVICE — SEE MEDLINE ITEM 146292

## (undated) DEVICE — SET SINGLE BASIN

## (undated) DEVICE — PAD PREP 50/CA

## (undated) DEVICE — HOOK DISSECTING 5MM

## (undated) DEVICE — TUBING INSUFFLATION 10

## (undated) DEVICE — SUT VLOC 180 ABSORB ESTITCH

## (undated) DEVICE — GLOVE SURGICAL LATEX SZ 7

## (undated) DEVICE — BLADE SURG CARBON STEEL SZ11

## (undated) DEVICE — SUT 2/0 36IN COATED VICRYL

## (undated) DEVICE — SEE MEDLINE ITEM 157117

## (undated) DEVICE — TROCAR ENDOPATH XCEL 5X100MM

## (undated) DEVICE — DRESSING AQUACEL AG 3.5X10IN

## (undated) DEVICE — NDL INSUF ULTRA VERESS 120MM

## (undated) DEVICE — SYR 50CC LL

## (undated) DEVICE — SOL CLEARIFY VISUALIZATION LAP

## (undated) DEVICE — SEE MEDLINE ITEM 146372

## (undated) DEVICE — MAT QUICK 40X30 FLOOR FLUID LF

## (undated) DEVICE — ENDOSTITCH INSTRUMENT

## (undated) DEVICE — DRAPE STERI LONG

## (undated) DEVICE — IRRIGATOR ENDOSCOPY DISP.

## (undated) DEVICE — SHEET DRAPE FAN-FOLDED 3/4

## (undated) DEVICE — APPLICATOR ARISTA FLEX XL

## (undated) DEVICE — LINER GLOVE POWDERFREE SZ 8.5

## (undated) DEVICE — POSITIONER HEAD DONUT 9IN FOAM

## (undated) DEVICE — GLOVE SURG BIOGEL LATEX SZ 7.5

## (undated) DEVICE — GLOVE SURGICAL LATEX SZ 8

## (undated) DEVICE — UNDERGLOVES BIOGEL PI SZ 7 LF

## (undated) DEVICE — PACK LAPAROSCOPY/PELVISCOPY II